# Patient Record
Sex: FEMALE | Race: BLACK OR AFRICAN AMERICAN | NOT HISPANIC OR LATINO | Employment: FULL TIME | URBAN - METROPOLITAN AREA
[De-identification: names, ages, dates, MRNs, and addresses within clinical notes are randomized per-mention and may not be internally consistent; named-entity substitution may affect disease eponyms.]

---
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
## 2018-03-09 ENCOUNTER — HOSPITAL ENCOUNTER (EMERGENCY)
Facility: HOSPITAL | Age: 18
Discharge: HOME | End: 2018-03-09
Attending: EMERGENCY MEDICINE
Payer: MEDICAID

## 2018-03-09 VITALS
OXYGEN SATURATION: 100 % | DIASTOLIC BLOOD PRESSURE: 85 MMHG | HEIGHT: 60 IN | HEART RATE: 55 BPM | WEIGHT: 120 LBS | TEMPERATURE: 98 F | SYSTOLIC BLOOD PRESSURE: 139 MMHG | RESPIRATION RATE: 18 BRPM | BODY MASS INDEX: 23.56 KG/M2

## 2018-03-09 DIAGNOSIS — R10.11 RIGHT UPPER QUADRANT ABDOMINAL PAIN: Primary | ICD-10-CM

## 2018-03-09 LAB
ALBUMIN SERPL BCP-MCNC: 4.5 G/DL
ALP SERPL-CCNC: 62 U/L
ALT SERPL W/O P-5'-P-CCNC: 13 U/L
ANION GAP SERPL CALC-SCNC: 7 MMOL/L
AST SERPL-CCNC: 17 U/L
B-HCG UR QL: NEGATIVE
BACTERIA #/AREA URNS HPF: NORMAL /HPF
BASOPHILS # BLD AUTO: 0.02 K/UL
BASOPHILS NFR BLD: 0.4 %
BILIRUB SERPL-MCNC: 0.8 MG/DL
BILIRUB UR QL STRIP: NEGATIVE
BUN SERPL-MCNC: 11 MG/DL
CALCIUM SERPL-MCNC: 9.5 MG/DL
CHLORIDE SERPL-SCNC: 109 MMOL/L
CLARITY UR: CLEAR
CO2 SERPL-SCNC: 24 MMOL/L
COLOR UR: YELLOW
CREAT SERPL-MCNC: 1.2 MG/DL
CTP QC/QA: YES
DIFFERENTIAL METHOD: ABNORMAL
EOSINOPHIL # BLD AUTO: 0.1 K/UL
EOSINOPHIL NFR BLD: 1.1 %
ERYTHROCYTE [DISTWIDTH] IN BLOOD BY AUTOMATED COUNT: 12.3 %
EST. GFR  (AFRICAN AMERICAN): >60 ML/MIN/1.73 M^2
EST. GFR  (NON AFRICAN AMERICAN): >60 ML/MIN/1.73 M^2
GLUCOSE SERPL-MCNC: 99 MG/DL
GLUCOSE UR QL STRIP: NEGATIVE
HCT VFR BLD AUTO: 40.4 %
HGB BLD-MCNC: 13.6 G/DL
HGB UR QL STRIP: ABNORMAL
KETONES UR QL STRIP: NEGATIVE
LEUKOCYTE ESTERASE UR QL STRIP: NEGATIVE
LIPASE SERPL-CCNC: 15 U/L
LYMPHOCYTES # BLD AUTO: 3 K/UL
LYMPHOCYTES NFR BLD: 53.6 %
MCH RBC QN AUTO: 28.2 PG
MCHC RBC AUTO-ENTMCNC: 33.7 G/DL
MCV RBC AUTO: 84 FL
MICROSCOPIC COMMENT: NORMAL
MONOCYTES # BLD AUTO: 0.3 K/UL
MONOCYTES NFR BLD: 5.1 %
NEUTROPHILS # BLD AUTO: 2.2 K/UL
NEUTROPHILS NFR BLD: 39.8 %
NITRITE UR QL STRIP: NEGATIVE
PH UR STRIP: 5 [PH] (ref 5–8)
PLATELET # BLD AUTO: 356 K/UL
PMV BLD AUTO: 10.3 FL
POTASSIUM SERPL-SCNC: 4.2 MMOL/L
PROT SERPL-MCNC: 7.4 G/DL
PROT UR QL STRIP: NEGATIVE
RBC # BLD AUTO: 4.82 M/UL
RBC #/AREA URNS HPF: 2 /HPF (ref 0–4)
SODIUM SERPL-SCNC: 140 MMOL/L
SP GR UR STRIP: 1.02 (ref 1–1.03)
SQUAMOUS #/AREA URNS HPF: 10 /HPF
URN SPEC COLLECT METH UR: ABNORMAL
UROBILINOGEN UR STRIP-ACNC: NEGATIVE EU/DL
WBC # BLD AUTO: 5.54 K/UL
WBC #/AREA URNS HPF: 4 /HPF (ref 0–5)

## 2018-03-09 PROCEDURE — 81000 URINALYSIS NONAUTO W/SCOPE: CPT

## 2018-03-09 PROCEDURE — 81025 URINE PREGNANCY TEST: CPT | Performed by: NURSE PRACTITIONER

## 2018-03-09 PROCEDURE — 63600175 PHARM REV CODE 636 W HCPCS: Performed by: NURSE PRACTITIONER

## 2018-03-09 PROCEDURE — 83690 ASSAY OF LIPASE: CPT

## 2018-03-09 PROCEDURE — 99284 EMERGENCY DEPT VISIT MOD MDM: CPT | Mod: 25

## 2018-03-09 PROCEDURE — 96374 THER/PROPH/DIAG INJ IV PUSH: CPT

## 2018-03-09 PROCEDURE — 96361 HYDRATE IV INFUSION ADD-ON: CPT

## 2018-03-09 PROCEDURE — 80053 COMPREHEN METABOLIC PANEL: CPT

## 2018-03-09 PROCEDURE — 25000003 PHARM REV CODE 250: Performed by: NURSE PRACTITIONER

## 2018-03-09 PROCEDURE — 85025 COMPLETE CBC W/AUTO DIFF WBC: CPT

## 2018-03-09 RX ORDER — ONDANSETRON 2 MG/ML
4 INJECTION INTRAMUSCULAR; INTRAVENOUS
Status: COMPLETED | OUTPATIENT
Start: 2018-03-09 | End: 2018-03-09

## 2018-03-09 RX ORDER — DICYCLOMINE HYDROCHLORIDE 20 MG/1
20 TABLET ORAL 2 TIMES DAILY PRN
Qty: 10 TABLET | Refills: 0 | Status: SHIPPED | OUTPATIENT
Start: 2018-03-09 | End: 2018-04-08

## 2018-03-09 RX ORDER — IBUPROFEN 600 MG/1
600 TABLET ORAL EVERY 6 HOURS PRN
Qty: 20 TABLET | Refills: 0 | Status: SHIPPED | OUTPATIENT
Start: 2018-03-09 | End: 2019-07-19

## 2018-03-09 RX ORDER — ONDANSETRON 4 MG/1
4 TABLET, FILM COATED ORAL EVERY 6 HOURS PRN
Qty: 10 TABLET | Refills: 0 | Status: SHIPPED | OUTPATIENT
Start: 2018-03-09 | End: 2019-07-19

## 2018-03-09 RX ADMIN — SODIUM CHLORIDE 1000 ML: 0.9 INJECTION, SOLUTION INTRAVENOUS at 04:03

## 2018-03-09 RX ADMIN — ONDANSETRON HYDROCHLORIDE 4 MG: 2 INJECTION, SOLUTION INTRAMUSCULAR; INTRAVENOUS at 04:03

## 2018-03-09 NOTE — ED PROVIDER NOTES
"Encounter Date: 3/9/2018    SCRIBE #1 NOTE: I, Nitesh Phillips, am scribing for, and in the presence of,  Esther Francois NP. I have scribed the following portions of the note - Other sections scribed: HPI and ROS.       History     Chief Complaint   Patient presents with    Abdominal Pain     "I went and gave some plasma yesterday and then I started feeling bad. At like 3:00 in the morning I started throwing up and my lips was tingling. I was feeling horrible, my stomach is flipping."      CC: Abdominal Pain     HPI: This 18 y.o F with no pertinent PMHx presents to the ED c/o acute onset of constant and severe (8/10) RUQ abdominal pain with associated nausea, chills, yellow emesis x4 and "tingling" lips since 0300. The pt states she is unable to tolerate p.o. The pt notes she donated plasma yesterday. The pt also states she has been bleeding constantly x1 month after discontinuing hormone birth control x5 months ago. 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 Additionally, the pt reports a sore throat x several months. The pt denies diarrhea, fever, cough, vaginal discharge, dysuria and frequency. No sick contacts. No prior tx.       The history is provided by the patient. No  was used.     Review of patient's allergies indicates:  No Known Allergies  Past Medical History:   Diagnosis Date    Asthma      Past Surgical History:   Procedure Laterality Date    WISDOM TOOTH EXTRACTION       No family history on file.  Social History   Substance Use Topics    Smoking status: Current Some Day Smoker    Smokeless tobacco: Never Used    Alcohol use Yes      Comment: 0cc     Review of Systems   Constitutional: Positive for chills. Negative for diaphoresis and fever.   HENT: Negative for rhinorrhea and sore throat.    Eyes: Negative for redness.   Respiratory: Negative for cough and shortness of breath.    Cardiovascular: Negative for chest pain.   Gastrointestinal: " "Positive for abdominal pain (RUQ), nausea and vomiting ("yellow" x4). Negative for diarrhea.   Genitourinary: Positive for vaginal bleeding. Negative for dysuria, frequency and urgency.   Musculoskeletal: Negative for back pain and neck pain.   Skin: Negative for rash.   Psychiatric/Behavioral: The patient is not nervous/anxious.        Physical Exam     Initial Vitals [03/09/18 1454]   BP Pulse Resp Temp SpO2   135/81 60 18 99 °F (37.2 °C) 98 %      MAP       99         Physical Exam    Constitutional: She appears well-developed and well-nourished. She is not diaphoretic. No distress.   HENT:   Head: Normocephalic and atraumatic.   Right Ear: External ear normal.   Left Ear: External ear normal.   Nose: Nose normal.   Mouth/Throat: Oropharynx is clear and moist. No oropharyngeal exudate.   Eyes: Conjunctivae and EOM are normal. Pupils are equal, round, and reactive to light. Right eye exhibits no discharge.   Neck: Normal range of motion.   Cardiovascular: Normal rate, regular rhythm and normal heart sounds. Exam reveals no gallop and no friction rub.    No murmur heard.  Pulmonary/Chest: Breath sounds normal. No respiratory distress. She has no wheezes. She has no rhonchi. She has no rales. She exhibits no tenderness.   Abdominal: Soft. Bowel sounds are normal. She exhibits no distension and no mass. There is no hepatosplenomegaly. There is tenderness in the right upper quadrant and epigastric area. There is no rigidity, no rebound, no guarding, no CVA tenderness, no tenderness at McBurney's point and negative Flanagan's sign.   Musculoskeletal: Normal range of motion.   Neurological: She is alert and oriented to person, place, and time.   Skin: Skin is warm and dry.   Psychiatric: She has a normal mood and affect. Her behavior is normal.         ED Course   Procedures  Labs Reviewed   CBC W/ AUTO DIFFERENTIAL - Abnormal; Notable for the following:        Result Value    Platelets 356 (*)     Lymph% 53.6 (*)     All " other components within normal limits   COMPREHENSIVE METABOLIC PANEL - Abnormal; Notable for the following:     Anion Gap 7 (*)     All other components within normal limits   URINALYSIS - Abnormal; Notable for the following:     Occult Blood UA 2+ (*)     All other components within normal limits   LIPASE   URINALYSIS MICROSCOPIC   POCT URINE PREGNANCY             Medical Decision Making:   ED Management:  This is an evaluation of a 18 y.o. female that presents to the Emergency Department for Abdominal Pain. Physical Exam shows a non-toxic, afebrile, and well appearing female. On examination the abdomen is flat without distention.  There is no surface trauma, scars, incisions.  Bowel sounds are present in all 4 quadrants.  She exhibits some tenderness with palpation of the right upper quadrant as well as epigastric region.  No guarding, rigidity to palpation.  No masses, pulsation in epigastric area.  No organomegaly.  Negative Flanagan sign.  No periumbilical tenderness.  No rebound in the lower quadrants.  Nontender over McBurney's point.  No suprapubic tenderness or distention.  Good femoral pulses bilaterally.  No CVA tenderness.    Vital Signs Are Reassuring.  RESULTS:   UPT negative.  CBC was negativefor leukocytosis  indicating unlikely systemic infection, the H&H was stable and was within normal limits and indicating absence of anemia. The platelet count was normal indicating the absence of a tendency toward bleeding.  The chemistry was negative for hypo-or hyper natremia, kalemia, chloridemia, or other electrolyte abnormalities; BUN and creatinine were within normal limits indicating normal kidney function, ALT and AST were within normal limits indicating normal liver function, there was no transaminitis.  Lipase within normal limits indicating normal pancreatic function.  UA is negative for infection, no nitrites, leukocytes, or protein present.2+ occult blood; patient is menstruating.  Ultrasound of the  "abdomen with possible trace perihepatic fluid.  No findings suggestive of acute cholecystitis.    My overall impression is right upper quadrant abdominal pain. I considered, but at this time, do not suspect appendicitis, pancreatitis, mesenteric ischemia, obstruction, cholecystitis, peptic ulcer disease, pancreatitis, diverticulitis, colitis, volvulus, hernia, UTI.    She was given IV fluids and Zofran with good relief in symptoms.  No episodes of vomiting while in the ED.  She is tolerating oral intake reporting she is "hungry".  Abdomen remains soft.  I'll discharge her home with short course of Zofran and Bentyl for nausea and abdominal cramping.  Abdominal pain precautions given.    ED Course: IV fluids, Zofran. D/C Meds: Zofran, Bentyl, ibuprofen. Additional D/C Information: Abdominal Pain Precautions. The diagnosis, treatment plan, instructions for follow-up and reevaluation with her PCP as well as ED return precautions were discussed and understanding was verbalized. All questions or concerns have been addressed.     This case was discussed with Dr. Marvin who is in agreement with my assessment and plan.             Scribe Attestation:   Scribe #1: I performed the above scribed service and the documentation accurately describes the services I performed. I attest to the accuracy of the note.    Attending Attestation:           Physician Attestation for Scribe:  Physician Attestation Statement for Scribe #1: I, Esther Francois NP, reviewed documentation, as scribed by Nitesh Phillips in my presence, and it is both accurate and complete.                    Clinical Impression:   The encounter diagnosis was Right upper quadrant abdominal pain.    Disposition:   Disposition: Discharged  Condition: Stable                        Esther Francois NP  03/09/18 9524    "

## 2018-03-09 NOTE — ED TRIAGE NOTES
Pt states she.s had stomach pain, nausea, vomitting starting last night. States she took ibuprofen for pain. Denies any trauma

## 2018-03-10 NOTE — DISCHARGE INSTRUCTIONS
You have been prescribed ibuprofen for pain. This is an Non-Steroidal Anti-Inflammatory (NSAID) Medication. Please do not take any additional NSAIDs while you are taking this medication including (Advil, Aleve, Motrin, Ibuprofen, Mobic\meloxicam, Naprosyn, etc.). Please stop taking this medication if you experience: weakness, itching, yellow skin or eyes, joint pains, vomiting blood, blood or black stools, unusual weight gain, or swelling in your arms, legs, hands, or feet.     Please return to the Emergency Department for any new or worsening symptoms including: worsening abdominal pain, dark\black\bloody bowel movements, vomiting blood, hard abdomen, fever, chest pain, shortness of breath, loss of consciousness or any other concerns.     Please follow up with your Primary Care Provider within in the week. If you do not have a Primary Care Provider, you may contact the one listed on your discharge paperwork or you may also call the [facility] Appointment Desk at [phone] to schedule an appointment with a Primary Care Provider.

## 2019-07-19 ENCOUNTER — OFFICE VISIT (OUTPATIENT)
Dept: OBSTETRICS AND GYNECOLOGY | Facility: CLINIC | Age: 19
End: 2019-07-19
Payer: MEDICAID

## 2019-07-19 VITALS
HEIGHT: 60 IN | DIASTOLIC BLOOD PRESSURE: 61 MMHG | SYSTOLIC BLOOD PRESSURE: 110 MMHG | BODY MASS INDEX: 24.17 KG/M2 | WEIGHT: 123.13 LBS

## 2019-07-19 DIAGNOSIS — Z32.01 PREGNANCY CONFIRMED BY POSITIVE URINE TEST: Primary | ICD-10-CM

## 2019-07-19 DIAGNOSIS — Z34.00 SUPERVISION OF NORMAL FIRST PREGNANCY, ANTEPARTUM: ICD-10-CM

## 2019-07-19 LAB
B-HCG UR QL: POSITIVE
CTP QC/QA: YES

## 2019-07-19 PROCEDURE — 99203 PR OFFICE/OUTPT VISIT, NEW, LEVL III, 30-44 MIN: ICD-10-PCS | Mod: S$PBB,TH,, | Performed by: OBSTETRICS & GYNECOLOGY

## 2019-07-19 PROCEDURE — 81025 URINE PREGNANCY TEST: CPT | Mod: PBBFAC | Performed by: OBSTETRICS & GYNECOLOGY

## 2019-07-19 PROCEDURE — 99999 PR PBB SHADOW E&M-EST. PATIENT-LVL II: CPT | Mod: PBBFAC,,, | Performed by: OBSTETRICS & GYNECOLOGY

## 2019-07-19 PROCEDURE — 99999 PR PBB SHADOW E&M-EST. PATIENT-LVL II: ICD-10-PCS | Mod: PBBFAC,,, | Performed by: OBSTETRICS & GYNECOLOGY

## 2019-07-19 PROCEDURE — 99212 OFFICE O/P EST SF 10 MIN: CPT | Mod: PBBFAC,TH | Performed by: OBSTETRICS & GYNECOLOGY

## 2019-07-19 PROCEDURE — 99203 OFFICE O/P NEW LOW 30 MIN: CPT | Mod: S$PBB,TH,, | Performed by: OBSTETRICS & GYNECOLOGY

## 2019-07-19 NOTE — PROGRESS NOTES
History & Physical  Gynecology      SUBJECTIVE:     Chief Complaint: Possible Pregnancy       History of Present Illness:  Missed Menses/ Possible Pregnancy  Ms. Islas is a G0 who presents to clinic who complains of menstrual irregularity. She believes she could be pregnant. Pregnancy is desired. Sexual Activity: single partner, contraception: none. Current symptoms also include: breast tenderness, fatigue, morning sickness, nausea and positive home pregnancy test. Last period was normal.     Patient's last menstrual period was 2019. OLIVA: 03/10/2020  EGA: 6w3d    Review of patient's allergies indicates:  No Known Allergies    History reviewed. No pertinent past medical history.  Past Surgical History:   Procedure Laterality Date    WISDOM TOOTH EXTRACTION       OB History        0    Para   0    Term   0       0    AB   0    Living   0       SAB   0    TAB   0    Ectopic   0    Multiple   0    Live Births   0               Family History   Problem Relation Age of Onset    Breast cancer Maternal Grandmother     Cancer Brother         stomach     Social History     Tobacco Use    Smoking status: Former Smoker     Last attempt to quit: 2019     Years since quittin.0    Smokeless tobacco: Never Used   Substance Use Topics    Alcohol use: Yes     Comment: 0cc    Drug use: No       Current Outpatient Medications   Medication Sig    prenatal 26-iron ps-folic-dha (VITAFOL-ONE) 29 mg iron- 1 mg-200 mg Cap Take 1 capsule by mouth once daily.     No current facility-administered medications for this visit.      Review of Systems:  Review of Systems   Constitutional: Negative for chills and fever.   Respiratory: Negative for cough and wheezing.    Cardiovascular: Negative for chest pain and palpitations.   Gastrointestinal: Positive for nausea. Negative for abdominal pain and vomiting.   Genitourinary: Negative for dysuria, frequency, hematuria, pelvic pain, vaginal bleeding, vaginal  discharge and vaginal pain.   Integumentary:  Positive for breast tenderness.   Neurological: Negative for headaches.   Breast: Positive for tenderness.       OBJECTIVE:     Physical Exam:  Physical Exam   Constitutional: She is oriented to person, place, and time. She appears well-developed and well-nourished.   Cardiovascular: Normal rate.   Pulmonary/Chest: Effort normal. No respiratory distress.   Genitourinary: Pelvic exam was performed with patient supine.   Neurological: She is alert and oriented to person, place, and time.   Skin: Skin is warm and dry.   Psychiatric: She has a normal mood and affect.   Nursing note and vitals reviewed.      ASSESSMENT:       ICD-10-CM ICD-9-CM    1. Pregnancy confirmed by positive urine test Z32.01 V72.42 POCT urine pregnancy      prenatal 26-iron ps-folic-dha (VITAFOL-ONE) 29 mg iron- 1 mg-200 mg Cap   2. Supervision of normal first pregnancy, antepartum Z34.00 V22.0 US OB/GYN Procedure ([facility]) - Extended List - Future      Plan:      Earnest was seen today for possible pregnancy.    Diagnoses and all orders for this visit:    Pregnancy confirmed by positive urine test  -     POCT urine pregnancy; positive  -     prenatal 26-iron ps-folic-dha (VITAFOL-ONE) 29 mg iron- 1 mg-200 mg Cap; Take 1 capsule by mouth once daily.  - Reviewed Past medical, surgical, and obstetric history  - Reviewed medication lists  - First trimester dating/confirmation US ordered and scheduled today  - Patient was counseled today on routine 1st trimester precautions, including vaginal bleeding and abdominal pain. We also discussed proper weight gain based on the [place] [facility]'s recommendations based on her pre-pregnancy weight, foods to avoid in pregnancy (i.e. sushi, fish that are high in mercury, cold deli meat, and unpasteurized cheeses), environmental precautions such as cat litter, and prenatal vitamin options (i.e. stool softener, DHA).     Orders Placed This Encounter    Procedures    POCT urine pregnancy    US OB/GYN Procedure ([facility]) - Extended List - Future       Follow up in about 4 weeks (around 8/16/2019) for initial OB.    Counseling time: 15 minutes    Aletha Pang

## 2019-07-19 NOTE — PATIENT INSTRUCTIONS
Pregnancy    Your exam today shows that you are pregnant.  Pregnancy symptoms  During pregnancy your bodys hormones change. This causes physical and emotional changes. This is normal. Knowing what to expect is important for your piece of mind and so you know when to seek help for a problem. Here are some of the most common symptoms:  · Morning sickness or nausea. This can happen any time of the day or night.  · Tender, swollen breasts  · Need to urinate frequently  · Tiredness or fatigue  · Dizziness  · Indigestion or heartburn  · Food cravings or turn-offs  · Constipation  · Emotional changes. This can range from anxiety to excitement to depression.  General care for a healthy pregnancy  Here are things you can do to help make sure your baby is born healthy:  · Rest when you feel tired. This is especially true in the later months of pregnancy.  · Drink more fluids. Your body needs more fluids than you may be used to. Drink 8 to10 glasses of juice, milk, or water every day.  · Eat well-balanced meals. Eat at regular times to give your body enough protein. You can expect to gain about 30 pounds during the pregnancy. Dont try to diet or lose weight while you are pregnant.  · Take a prenatal vitamin every day. This helps you meet the extra nutritional needs of pregnancy.  · Dont take any other medicine during your pregnancy unless your healthcare provider tells you to. This includes prescription medicines and those you buy over the counter. Many medicines can harm the growing baby.  · If you have nausea or vomiting, dont eat greasy or fried foods. Eat several smaller meals throughout the day rather than 3 large meals.  · If you smoke, you must stop. The nicotine you breathe in goes right to the baby.  · Stay away from alcohol, even in moderate amounts. Daily drinking will harm your baby and can cause permanent brain damage.  · Dont use recreational drugs, especially cocaine, crack, and heroin. These will harm  your baby. Also avoid marijuana.  · If you were using recreational drugs or prescribed medicine when you found out that you were pregnant, talk with your healthcare provider about possible effects on your growing baby.  · If you have medical problems that you need to take medicine for, talk with your healthcare provider.  Follow-up care  Call your healthcare provider to arrange for prenatal care. Prenatal care is important. You can see your family provider, a pregnancy specialist (obstetrician), or a primary care clinic.  When to seek medical advice  Call your healthcare provider right away if any of these occur:  · Vaginal bleeding  · Pain in your belly (abdomen) or back that is moderate or severe  · Lots of vomiting, or you cant keep any fluids down for 6 hours  · Burning feeling when you urinate  · Headache, dizziness, or rapid weight gain  · Fever  · Vision changes or blurred vision  Date Last Reviewed: 10/1/2016  © 0372-7478 [employer]. [address] [place] [address], [place] [address]. All rights reserved. This information is not intended as a substitute for professional medical care. Always follow your healthcare professional's instructions.

## 2019-07-30 ENCOUNTER — TELEPHONE (OUTPATIENT)
Dept: OTOLARYNGOLOGY | Facility: CLINIC | Age: 19
End: 2019-07-30

## 2019-07-30 DIAGNOSIS — O21.9 NAUSEA AND VOMITING IN PREGNANCY: Primary | ICD-10-CM

## 2019-07-30 RX ORDER — ONDANSETRON 4 MG/1
8 TABLET, ORALLY DISINTEGRATING ORAL EVERY 8 HOURS
Qty: 20 TABLET | Refills: 0 | Status: ON HOLD | OUTPATIENT
Start: 2019-07-30 | End: 2020-03-03 | Stop reason: HOSPADM

## 2019-08-09 ENCOUNTER — PROCEDURE VISIT (OUTPATIENT)
Dept: MATERNAL FETAL MEDICINE | Facility: CLINIC | Age: 19
End: 2019-08-09
Payer: MEDICAID

## 2019-08-09 DIAGNOSIS — Z34.00 SUPERVISION OF NORMAL FIRST PREGNANCY, ANTEPARTUM: ICD-10-CM

## 2019-08-09 PROCEDURE — 99499 UNLISTED E&M SERVICE: CPT | Mod: S$PBB,,, | Performed by: PEDIATRICS

## 2019-08-09 PROCEDURE — 76801 PR US, OB <14WKS, TRANSABD, SINGLE GESTATION: ICD-10-PCS | Mod: 26,S$PBB,, | Performed by: PEDIATRICS

## 2019-08-09 PROCEDURE — 76817 PR US, OB, TRANSVAG APPROACH: ICD-10-PCS | Mod: 26,S$PBB,, | Performed by: PEDIATRICS

## 2019-08-09 PROCEDURE — 76817 TRANSVAGINAL US OBSTETRIC: CPT | Mod: PBBFAC,PO | Performed by: PEDIATRICS

## 2019-08-09 PROCEDURE — 76801 OB US < 14 WKS SINGLE FETUS: CPT | Mod: PBBFAC,PO | Performed by: PEDIATRICS

## 2019-08-09 PROCEDURE — 76801 OB US < 14 WKS SINGLE FETUS: CPT | Mod: 26,S$PBB,, | Performed by: PEDIATRICS

## 2019-08-09 PROCEDURE — 99499 NO LOS: ICD-10-PCS | Mod: S$PBB,,, | Performed by: PEDIATRICS

## 2019-08-09 PROCEDURE — 76817 TRANSVAGINAL US OBSTETRIC: CPT | Mod: 26,S$PBB,, | Performed by: PEDIATRICS

## 2019-08-16 ENCOUNTER — INITIAL PRENATAL (OUTPATIENT)
Dept: OBSTETRICS AND GYNECOLOGY | Facility: CLINIC | Age: 19
End: 2019-08-16
Payer: MEDICAID

## 2019-08-16 VITALS — SYSTOLIC BLOOD PRESSURE: 100 MMHG | BODY MASS INDEX: 24.03 KG/M2 | WEIGHT: 123 LBS | DIASTOLIC BLOOD PRESSURE: 60 MMHG

## 2019-08-16 DIAGNOSIS — N88.9 CERVICAL LESION: ICD-10-CM

## 2019-08-16 DIAGNOSIS — O21.9 NAUSEA AND VOMITING IN PREGNANCY: ICD-10-CM

## 2019-08-16 DIAGNOSIS — Z34.00 SUPERVISION OF NORMAL FIRST PREGNANCY, ANTEPARTUM: Primary | ICD-10-CM

## 2019-08-16 PROCEDURE — 99215 PR OFFICE/OUTPT VISIT, EST, LEVL V, 40-54 MIN: ICD-10-PCS | Mod: TH,S$PBB,, | Performed by: OBSTETRICS & GYNECOLOGY

## 2019-08-16 PROCEDURE — 99215 OFFICE O/P EST HI 40 MIN: CPT | Mod: TH,S$PBB,, | Performed by: OBSTETRICS & GYNECOLOGY

## 2019-08-16 PROCEDURE — 87491 CHLMYD TRACH DNA AMP PROBE: CPT

## 2019-08-16 PROCEDURE — 87481 CANDIDA DNA AMP PROBE: CPT | Mod: 59

## 2019-08-16 PROCEDURE — 88175 CYTOPATH C/V AUTO FLUID REDO: CPT

## 2019-08-16 PROCEDURE — 87086 URINE CULTURE/COLONY COUNT: CPT

## 2019-08-16 PROCEDURE — 99212 OFFICE O/P EST SF 10 MIN: CPT | Mod: PBBFAC,TH | Performed by: OBSTETRICS & GYNECOLOGY

## 2019-08-16 PROCEDURE — 87801 DETECT AGNT MULT DNA AMPLI: CPT

## 2019-08-16 PROCEDURE — 99999 PR PBB SHADOW E&M-EST. PATIENT-LVL II: ICD-10-PCS | Mod: PBBFAC,,, | Performed by: OBSTETRICS & GYNECOLOGY

## 2019-08-16 PROCEDURE — 87661 TRICHOMONAS VAGINALIS AMPLIF: CPT

## 2019-08-16 PROCEDURE — 87529 HSV DNA AMP PROBE: CPT | Mod: 59

## 2019-08-16 PROCEDURE — 99999 PR PBB SHADOW E&M-EST. PATIENT-LVL II: CPT | Mod: PBBFAC,,, | Performed by: OBSTETRICS & GYNECOLOGY

## 2019-08-16 RX ORDER — METOCLOPRAMIDE 5 MG/1
5 TABLET ORAL 4 TIMES DAILY PRN
Qty: 40 TABLET | Refills: 0 | Status: SHIPPED | OUTPATIENT
Start: 2019-08-16 | End: 2019-09-10 | Stop reason: SDUPTHER

## 2019-08-16 NOTE — PATIENT INSTRUCTIONS
Adapting to Pregnancy: First Trimester  As your body adjusts, you may have to change or limit your daily activities. Youll need more rest. You may also need to use the energy you have more wisely.     Eat stomach-friendly foods like cottage cheese, crackers, or bread throughout the day.   Your changing body  Almost every part of your body is affected as you adapt to pregnancy. The uterus and cervix will begin to soften right away. You may not look very pregnant during the first 3 months. But you are likely to have some common signs of early pregnancy:  · Nausea  · Fatigue  · Frequent urination  · Mood swings  · Bloating of the abdomen  · Missed or light periods (first trimester bleeding)  · Nipple or breast tenderness, breast swelling  Its not too late to start good habits  What matters most is protecting your baby from this moment on. If you smoke, drink alcohol, or use drugs, now is the time to stop. If you need help, talk with your healthcare provider.  · Smoking increases the risk of  stillbirth or having a low-birth-weight baby. If you smoke, quit now.  · Alcohol and drugs have been linked with miscarriage, birth defects, intellectual disability, and low birth weight. Do not drink alcohol or take drugs.  Tips to relieve nausea  Although nausea can happen at any time of the day, it may be worse in the morning. To help prevent nausea:  · Eat small, light meals at frequent intervals.  · Get up slowly. Eat a few unsalted crackers before you get out of bed.  · Avoid smells that bother you.  · Avoid spicy and fatty foods.  · Eat an ice pop in your favorite flavor.  · Get plenty of rest.  · Ask your healthcare provider about taking christopher or vitamin B6 for nausea and vomiting.  · Talk with your healthcare provider if you take vitamins that upset your stomach.  Work concerns  The end of the first trimester is a good time to discuss working during pregnancy with your employer. Follow your healthcare providers  advice if your job requires you to stand for a long time, work with hazardous tools, or even sit at a desk all day. Your workspace, workload, or scheduled hours may need to be adjusted. Perhaps you can change body postures more often or take an extra break.  Advice for travel  Talk to your healthcare provider first, but the second trimester may be the best time for any travel. You may be advised to avoid certain trips while youre pregnant. Food and water can be concerns in developing countries. Travel by car is a good choice, as you can stop, get out, and stretch. Bring snacks and water along. Fasten the lap belt below your belly, low over your hips. Also be sure to wear the shoulder harness.  Intimacy  Unless your healthcare provider tells you to, there is no reason to stop having sex while youre pregnant. You or your partner may notice changes in desire. Desire may be less in the first trimester, due to nausea and fatigue. In the second trimester, sex may be very enjoyable. The third trimester can be a challenge comfort-wise. Try different positions and see whats best for you both.  Date Last Reviewed: 8/16/2015  © 3337-2309 [employer]. [address] [place] [address], [place] [address]. All rights reserved. This information is not intended as a substitute for professional medical care. Always follow your healthcare professional's instructions.

## 2019-08-16 NOTE — PROGRESS NOTES
Earnest Islas is a 19 y.o. , presents today for initial OB visit    HPI: She reports amenorrhea since Patient's last menstrual period was 2019.. Prior to LMP, menses were regular occuring every 28 days prior to this.  She is not currently on any contraception. She c/o daily reports nausea and  vomiting. She reports that she has noticed breast tenderness. She denies vaginal bleeding since LMP.    SOCIAL HISTORY: Denies emotional/mental/physical/sexual violence or abuse. Feels safe at home.Feeling excited about pregnancy. She does not work. This is the first baby for both of them.     PAP HISTORY: She has never had a pap smear in the past. She denies any history of abnormal pap smear or STDs.     She denies long-term chronic medical conditions.    Review of patient's allergies indicates:  No Known Allergies  History reviewed. No pertinent past medical history.  Past Surgical History:   Procedure Laterality Date    WISDOM TOOTH EXTRACTION  2018     Past Surgical History:   Procedure Laterality Date    WISDOM TOOTH EXTRACTION  2018     OB History    Para Term  AB Living   1 0 0 0 0 0   SAB TAB Ectopic Multiple Live Births   0 0 0 0 0      # Outcome Date GA Lbr Chad/2nd Weight Sex Delivery Anes PTL Lv   1 Current              OB History        1    Para   0    Term   0       0    AB   0    Living   0       SAB   0    TAB   0    Ectopic   0    Multiple   0    Live Births   0               Social History     Socioeconomic History    Marital status: Single     Spouse name: Not on file    Number of children: Not on file    Years of education: Not on file    Highest education level: Not on file   Occupational History    Not on file   Social Needs    Financial resource strain: Not on file    Food insecurity:     Worry: Not on file     Inability: Not on file    Transportation needs:     Medical: Not on file     Non-medical: Not on file   Tobacco Use    Smoking status:  Former Smoker     Last attempt to quit: 2019     Years since quittin.1    Smokeless tobacco: Never Used   Substance and Sexual Activity    Alcohol use: Yes     Comment: 0cc    Drug use: No    Sexual activity: Yes     Birth control/protection: None   Lifestyle    Physical activity:     Days per week: Not on file     Minutes per session: Not on file    Stress: Not on file   Relationships    Social connections:     Talks on phone: Not on file     Gets together: Not on file     Attends [employer] service: Not on file     Active member of club or organization: Not on file     Attends meetings of clubs or organizations: Not on file     Relationship status: Not on file   Other Topics Concern    Not on file   Social History Narrative    Not on file     Family History   Problem Relation Age of Onset    Breast cancer Maternal Grandmother     Cancer Brother         stomach     Social History     Substance and Sexual Activity   Sexual Activity Yes    Birth control/protection: None       GENETIC SCREENING   Patient's age 35 years or older as of estimated date of delivery? NO  Neural tube defect (meningomyelocele, spina bifida, or anencephaly)? NO  Down syndrome? NO  Seth-Sachs (Ashkenazi Anabaptist, Cajun, Georgian Surinamese)? NO  Canavan disease (Ashkenazi Anabaptist)? NO  Familial dysautonomia (Ashkenazi Anabaptist)? NO  Sickle cell disease or trait ()? NO  Hemophilia or other blood disorders? NO  Cystic fibrosis? NO  Muscular dystrophy? NO  Talisha's chorea? NO  Thalassemia (Italian, Greek, Mediterranean, or  background) MCV less than 80? NO  Congenital heart defect? NO  Mental retardation/autism? NO   If Yes, was person tested for Fragile X? NO  Other inherited genetic or chromosomal disorder? NO  Maternal metabolic disorder (e.g. type 1 diabetes, PKU)? NO  Patient or baby's father had a child with birth defects not listed above? NO  Recurrent pregnancy loss or a stillbirth: NO  Medications (including  supplements, vitamins, herbs or OTC drugs)/illicit/recreational drugs/alcohol since last menstrual period? none    Dean Jensen MD     ROS:  Constitutional/Gen: Denies fevers, chills, malaise, or weight loss. reports fatigue   Psych: Denies depression, anxiety  CV/vasc: Denies heart palpitations or edema  Resp: Denies SOB or dyspnea  Breasts: Denies mass, nipple discharge, or trauma. reports breast tenderness.  GI: Denies constipation, diarrhea, or vomiting. reports nausea.  : Denies vaginal discharge, dysuria or pelvic pain. reports urinary frequency      OBJECTIVE:  /60   Wt 55.8 kg (123 lb 0.3 oz)   LMP 2019   BMI 24.03 kg/m²   Constitutional/Gen: NAD, appears stated age  Lung: normal resp effort, CTAB  Heart: normal HR, RRR   Back: negative CVAT  Abdomen: soft, nontender, no masses, and bowel sounds normal, no enlargement  External genitalia: no lesions or discharge, normal hair distribution  Vagina: normal appearance, no lesions, no discharge, no evidence cystocele or rectocele.  Cervix: normal appearance, no discharge, no lesions, negative CMT  Uterus: nontender, mobile, approx 12 week size, contour, and position  Neurologic: A&O x 3  Psych: affect appropriate and without signs of mood, thought or memory difficulty appreciated      ASSESSMENT:  19 y.o. female  with amenorrhea  Likely at 11w4d via US  Body mass index is 24.03 kg/m².  Patient Active Problem List   Diagnosis    Supervision of normal first pregnancy, antepartum       PLAN:  1. Supervision of Normal Pregnancy  -- + UPT in office, Patient's last menstrual period was 2019. --> OLIVA: 3/2/2020  -- Dating US done  -- Anatomical US ordered  -- Routine serum and urine prenatal labs today    2. Physical exam today  -- Discussed ASCCP pap guidelines. Last pap: none  -- Pap smear without HPV (19 y.o.) sent today    3. BMI 24.03  -- Discussed IOM recommended weight gain of:   Underweight Less than 18.5 28-40    Normal  Weight 18.5-24.9  25-35    Overweight 25-29.9  15-25    Obese   30 and greater  11-20   -- Discussed criteria for delivery at [facility] r/t excessive pre-preg weight or excessive weight gain:   Pre-pregnancy BMI over 40 or excess pregnancy weight gain defined as:   Pre-preg BMI < 18.5; Excess weight gain = > 60 pound   Pre-preg BMI 18.5-24.9;  Excess weight gain = > 53 pounds   Pre-preg BMI 25-29.9;  Excess weight gain = > 38 pounds   Pre-preg BMI > 30;  Excess weight gain = > 30 pounds    4. Discussed nausea and vomiting in pregnancy  -- Education regarding lifestyle and dietary modifications  -- Reviewed use of B6/Unisom prn. Pt will notify us if no relief/worsening symptoms, will consider alternative therapies prn    5. Pregnancy education and couseling; handouts and booklet provided  -- Oriented to practice and anticipated prenatal course of care and how to contact us  -- Precautions/warning signs reviewed  -- Common complaints of pregnancy  -- Routine prenatal labs including HIV  -- Ultrasounds  -- Nutrition, prepregnant BMI, and recommended weight gain  -- Toxoplasmosis precautions (Cats/Raw Meat)  -- Sexual activity and exercise  -- Environmental/Work hazards  -- Travel  -- Tobacco (Ask, Advise, Assess, Assist, and Arrange), as well as alcohol and drug use  -- Use of any medications (Including supplements, Vitamins, Herbs, or OTC Drugs)  -- Domestic violence screen negative    6. Reviewed genetic testing options. Reviewed available first trimester and/or second  trimester screening options. Reviewed risk of false positive/negative results and recommendation of referral to [facility] in event of a positive result, for NIPT, US, and/or amniocentesis. Reviewed the possible estimated 1 in 300/500 risk of miscarriage with amniocentesis, even with a healthy fetus. Patient desires genetic screening.      Earnest was seen today for initial prenatal visit.    Diagnoses and all orders for this visit:    Supervision of normal  first pregnancy, antepartum  -     HIV 1/2 Ag/Ab (4th Gen); Future  -     RPR; Future  -     Hemoglobin Electrophoresis,Hgb A2 Jarocho.; Future  -     Type & Screen - Ob Profile; Future  -     Rubella antibody, IgG; Future  -     Urine culture  -     C. trachomatis/N. gonorrhoeae by AMP DNA  -     Vaginosis Screen by DNA Probe  -     US MFM Procedure ([facility]); Future  -     CBC auto differential; Future  -     Comprehensive metabolic panel; Future  -     Hepatitis panel, acute; Future  -     Liquid-based pap smear, screening  -     Maternal Integrated Screen Part 1; Future    Nausea and vomiting in pregnancy  -     metoclopramide HCl (REGLAN) 5 MG tablet; Take 1 tablet (5 mg total) by mouth 4 (four) times daily as needed (nausea).    Cervical lesion  -     HSV by Rapid PCR, Non-Blood [facility]; Cervix (7 o'clock)        Orders Placed This Encounter   Procedures    Urine culture    C. trachomatis/N. gonorrhoeae by AMP DNA    Vaginosis Screen by DNA Probe    HIV 1/2 Ag/Ab (4th Gen)    RPR    Hemoglobin Electrophoresis,Hgb A2 Jarocho.    Rubella antibody, IgG    CBC auto differential    Comprehensive metabolic panel    Hepatitis panel, acute    Maternal Integrated Screen Part 1    HSV by Rapid PCR, Non-Blood [facility]; Cervix (7 o'clock)    Type & Screen - Ob Profile    US MFM Procedure ([facility])       Follow up in about 4 weeks (around 9/13/2019) for routine OB.      RTC x 4 weeks, call or present sooner prn.     Updated Medication List:  Current Outpatient Medications   Medication Sig Dispense Refill    ondansetron (ZOFRAN-ODT) 4 MG TbDL Take 2 tablets (8 mg total) by mouth every 8 (eight) hours. 20 tablet 0    prenatal 26-iron ps-folic-dha (VITAFOL-ONE) 29 mg iron- 1 mg-200 mg Cap Take 1 capsule by mouth once daily. 90 capsule 3    metoclopramide HCl (REGLAN) 5 MG tablet Take 1 tablet (5 mg total) by mouth 4 (four) times daily as needed (nausea). 40 tablet 0     No current facility-administered  medications for this visit.          Aletha Monahan MD  8/16/2019 11:25 AM

## 2019-08-18 LAB — BACTERIA UR CULT: NORMAL

## 2019-08-19 LAB
BACTERIAL VAGINOSIS DNA: NEGATIVE
C TRACH DNA SPEC QL NAA+PROBE: NOT DETECTED
CANDIDA GLABRATA DNA: NEGATIVE
CANDIDA KRUSEI DNA: NEGATIVE
CANDIDA RRNA VAG QL PROBE: POSITIVE
HSV1 DNA SPEC QL NAA+PROBE: NEGATIVE
HSV2 DNA SPEC QL NAA+PROBE: NEGATIVE
N GONORRHOEA DNA SPEC QL NAA+PROBE: NOT DETECTED
SPECIMEN SOURCE: NORMAL
T VAGINALIS RRNA GENITAL QL PROBE: NEGATIVE

## 2019-08-20 DIAGNOSIS — B37.31 YEAST VAGINITIS: Primary | ICD-10-CM

## 2019-08-20 RX ORDER — TERCONAZOLE 4 MG/G
1 CREAM VAGINAL DAILY
Qty: 1 TUBE | Refills: 0 | Status: SHIPPED | OUTPATIENT
Start: 2019-08-20 | End: 2019-09-13

## 2019-09-10 DIAGNOSIS — O21.9 NAUSEA AND VOMITING IN PREGNANCY: ICD-10-CM

## 2019-09-10 RX ORDER — METOCLOPRAMIDE 5 MG/1
5 TABLET ORAL 4 TIMES DAILY PRN
Qty: 40 TABLET | Refills: 0 | Status: ON HOLD | OUTPATIENT
Start: 2019-09-10 | End: 2020-03-03 | Stop reason: HOSPADM

## 2019-09-10 NOTE — TELEPHONE ENCOUNTER
----- Message from Fam Nicholson sent at 9/10/2019  1:54 PM CDT -----  Contact: Self: [phone]  Type: RX Refill Request    Who Called: Self    Refill or New Rx:Refill    RX Name and Strength:Nausea medication    How is the patient currently taking it? (2XDay)    Is this a 30 day or 90 day RX:90 Day    Preferred Pharmacy with phone number:..  [id number] - [place] [address] [address] [facility]  [facility] [address]  [facility] [address]  Phone: [phone] Fax: [phone]      Local or Mail Order:Local    Ordering Provider:Dr. Monahan    Would the patient rather a call back or a response via [facility]? Callback    Best Call Back Number:[phone]    Additional Information: N/A

## 2019-09-13 ENCOUNTER — LAB VISIT (OUTPATIENT)
Dept: LAB | Facility: HOSPITAL | Age: 19
End: 2019-09-13
Attending: OBSTETRICS & GYNECOLOGY
Payer: MEDICAID

## 2019-09-13 ENCOUNTER — CLINICAL SUPPORT (OUTPATIENT)
Dept: OBSTETRICS AND GYNECOLOGY | Facility: CLINIC | Age: 19
End: 2019-09-13
Payer: MEDICAID

## 2019-09-13 ENCOUNTER — ROUTINE PRENATAL (OUTPATIENT)
Dept: OBSTETRICS AND GYNECOLOGY | Facility: CLINIC | Age: 19
End: 2019-09-13
Payer: MEDICAID

## 2019-09-13 VITALS — SYSTOLIC BLOOD PRESSURE: 106 MMHG | BODY MASS INDEX: 24.61 KG/M2 | DIASTOLIC BLOOD PRESSURE: 63 MMHG | WEIGHT: 126 LBS

## 2019-09-13 VITALS — BODY MASS INDEX: 24.63 KG/M2 | WEIGHT: 126.13 LBS

## 2019-09-13 DIAGNOSIS — Z34.00 SUPERVISION OF NORMAL FIRST PREGNANCY, ANTEPARTUM: Primary | ICD-10-CM

## 2019-09-13 DIAGNOSIS — R04.0 EPISTAXIS: ICD-10-CM

## 2019-09-13 DIAGNOSIS — Z34.00 SUPERVISION OF NORMAL FIRST PREGNANCY, ANTEPARTUM: ICD-10-CM

## 2019-09-13 DIAGNOSIS — Z23 NEEDS FLU SHOT: Primary | ICD-10-CM

## 2019-09-13 LAB
ABO + RH BLD: NORMAL
ALBUMIN SERPL BCP-MCNC: 3.6 G/DL (ref 3.5–5.2)
ALP SERPL-CCNC: 54 U/L (ref 55–135)
ALT SERPL W/O P-5'-P-CCNC: 12 U/L (ref 10–44)
ANION GAP SERPL CALC-SCNC: 4 MMOL/L (ref 8–16)
AST SERPL-CCNC: 16 U/L (ref 10–40)
BASOPHILS # BLD AUTO: 0.02 K/UL (ref 0–0.2)
BASOPHILS NFR BLD: 0.3 % (ref 0–1.9)
BILIRUB SERPL-MCNC: 0.4 MG/DL (ref 0.1–1)
BLD GP AB SCN CELLS X3 SERPL QL: NORMAL
BUN SERPL-MCNC: 13 MG/DL (ref 6–20)
CALCIUM SERPL-MCNC: 9 MG/DL (ref 8.7–10.5)
CHLORIDE SERPL-SCNC: 106 MMOL/L (ref 95–110)
CO2 SERPL-SCNC: 26 MMOL/L (ref 23–29)
CREAT SERPL-MCNC: 0.8 MG/DL (ref 0.5–1.4)
DIFFERENTIAL METHOD: ABNORMAL
EOSINOPHIL # BLD AUTO: 0.1 K/UL (ref 0–0.5)
EOSINOPHIL NFR BLD: 1.5 % (ref 0–8)
ERYTHROCYTE [DISTWIDTH] IN BLOOD BY AUTOMATED COUNT: 12.6 % (ref 11.5–14.5)
EST. GFR  (AFRICAN AMERICAN): >60 ML/MIN/1.73 M^2
EST. GFR  (NON AFRICAN AMERICAN): >60 ML/MIN/1.73 M^2
GLUCOSE SERPL-MCNC: 73 MG/DL (ref 70–110)
HCT VFR BLD AUTO: 32.6 % (ref 37–48.5)
HGB BLD-MCNC: 11 G/DL (ref 12–16)
LYMPHOCYTES # BLD AUTO: 2.3 K/UL (ref 1–4.8)
LYMPHOCYTES NFR BLD: 34.1 % (ref 18–48)
MCH RBC QN AUTO: 28.1 PG (ref 27–31)
MCHC RBC AUTO-ENTMCNC: 33.7 G/DL (ref 32–36)
MCV RBC AUTO: 83 FL (ref 82–98)
MONOCYTES # BLD AUTO: 0.4 K/UL (ref 0.3–1)
MONOCYTES NFR BLD: 5.6 % (ref 4–15)
NEUTROPHILS # BLD AUTO: 4 K/UL (ref 1.8–7.7)
NEUTROPHILS NFR BLD: 58.6 % (ref 38–73)
PLATELET # BLD AUTO: 278 K/UL (ref 150–350)
PMV BLD AUTO: 9.3 FL (ref 9.2–12.9)
POTASSIUM SERPL-SCNC: 4 MMOL/L (ref 3.5–5.1)
PROT SERPL-MCNC: 6.8 G/DL (ref 6–8.4)
RBC # BLD AUTO: 3.92 M/UL (ref 4–5.4)
SODIUM SERPL-SCNC: 136 MMOL/L (ref 136–145)
WBC # BLD AUTO: 6.8 K/UL (ref 3.9–12.7)

## 2019-09-13 PROCEDURE — 83020 HEMOGLOBIN ELECTROPHORESIS: CPT

## 2019-09-13 PROCEDURE — 85025 COMPLETE CBC W/AUTO DIFF WBC: CPT

## 2019-09-13 PROCEDURE — 84163 PAPPA SERUM: CPT

## 2019-09-13 PROCEDURE — 99999 PR PBB SHADOW E&M-EST. PATIENT-LVL II: ICD-10-PCS | Mod: PBBFAC,,,

## 2019-09-13 PROCEDURE — 86592 SYPHILIS TEST NON-TREP QUAL: CPT

## 2019-09-13 PROCEDURE — 86850 RBC ANTIBODY SCREEN: CPT

## 2019-09-13 PROCEDURE — 99212 OFFICE O/P EST SF 10 MIN: CPT | Mod: PBBFAC,27,TH,25 | Performed by: OBSTETRICS & GYNECOLOGY

## 2019-09-13 PROCEDURE — 99215 PR OFFICE/OUTPT VISIT, EST, LEVL V, 40-54 MIN: ICD-10-PCS | Mod: TH,S$PBB,, | Performed by: OBSTETRICS & GYNECOLOGY

## 2019-09-13 PROCEDURE — 99215 OFFICE O/P EST HI 40 MIN: CPT | Mod: TH,S$PBB,, | Performed by: OBSTETRICS & GYNECOLOGY

## 2019-09-13 PROCEDURE — 86762 RUBELLA ANTIBODY: CPT

## 2019-09-13 PROCEDURE — 80053 COMPREHEN METABOLIC PANEL: CPT

## 2019-09-13 PROCEDURE — 90471 IMMUNIZATION ADMIN: CPT | Mod: PBBFAC,VFC

## 2019-09-13 PROCEDURE — 80074 ACUTE HEPATITIS PANEL: CPT

## 2019-09-13 PROCEDURE — 36415 COLL VENOUS BLD VENIPUNCTURE: CPT

## 2019-09-13 PROCEDURE — 99999 PR PBB SHADOW E&M-EST. PATIENT-LVL II: CPT | Mod: PBBFAC,,, | Performed by: OBSTETRICS & GYNECOLOGY

## 2019-09-13 PROCEDURE — 86703 HIV-1/HIV-2 1 RESULT ANTBDY: CPT

## 2019-09-13 PROCEDURE — 99999 PR PBB SHADOW E&M-EST. PATIENT-LVL II: CPT | Mod: PBBFAC,,,

## 2019-09-13 PROCEDURE — 99999 PR PBB SHADOW E&M-EST. PATIENT-LVL II: ICD-10-PCS | Mod: PBBFAC,,, | Performed by: OBSTETRICS & GYNECOLOGY

## 2019-09-13 PROCEDURE — 99212 OFFICE O/P EST SF 10 MIN: CPT | Mod: PBBFAC,25

## 2019-09-13 NOTE — PROGRESS NOTES
PT ARRIVED @ 1001   , PT'S ORIGINAL APPT WAS @  1000 , PT WAS ROOMED @  1004 ( PT IS A LAST MIN ADD ON / SHE HAS SEEN HER DR FIRST)    Flu shot given via  r deltoid w/o incident, flu shot handout given to pt, instructed pt to wait in the waiting room for 10-15 mins in case of an immediate adverse reaction

## 2019-09-13 NOTE — PROGRESS NOTES
Complaints today: Ms. Islas reports that she is doing well but she does report a week of nosebleeds. She reports that she noticed them after she had taken a shower. She denies extreme heat in the bathroom while showering. She reports occasional nausea but she is able to tolerate her diet. She still does not feel any fetal movement but denies vaginal bleeding, LOF and contractions at this time.      /63   Wt 57.2 kg (125 lb 15.9 oz)   LMP 2019   BMI 24.61 kg/m²     19 y.o., at 15w4d by Estimated Date of Delivery: 3/2/20  Patient Active Problem List   Diagnosis    Supervision of normal first pregnancy, antepartum     OB History    Para Term  AB Living   1 0 0 0 0 0   SAB TAB Ectopic Multiple Live Births   0 0 0 0 0      # Outcome Date GA Lbr Chad/2nd Weight Sex Delivery Anes PTL Lv   1 Current                Dating reviewed    Allergies and problem list reviewed and updated    Medical and surgical history reviewed    Prenatal labs reviewed and updated    Physical Exam:  ABD: soft, gravid, nontender, 16cm    Assessment:  Earnest was seen today for routine prenatal visit.    Diagnoses and all orders for this visit:    Supervision of normal first pregnancy, antepartum  - Doing well  - Influenza shot today  - [facility] US in October    Epistaxis  - Discussed how common nosebleeds in pregnancy is with patient  - Discussed ways to stop nosebleeds when she is having them which includes but is not limited to:   Sitting or standing, keep your head upright. This reduces the pressure in the blood vessels inside your nose and will help to slow down the bleeding.     Pinch the soft part of your nose, underneath the bony ridge, between your thumb and forefinger. Once you have done this, the two sides of your nose should be pressed together.      Keep pinching, without releasing, for 10 minutes.      If your nose is bleeding a lot, you may want to lean slightly forward and breathe through your mouth so the  blood runs out of your nose, rather than down the back of your throat.     After 5-10 minutes, gently release your pinch to see if the bleeding has stopped.     If your nose is still bleeding, continue pinching your nose but also head to the ED      No orders of the defined types were placed in this encounter.      Follow up in about 4 weeks (around 10/11/2019) for routine OB.

## 2019-09-13 NOTE — PATIENT INSTRUCTIONS
Influenza (Flu) and Pregnancy  Influenza (the flu) is an infection of the respiratory tract. The tract is made up of your mouth, nose, and lungs, and the passages between them. The flu can make a pregnant woman very ill. This is because pregnant women are at high risk for flu complications. These complications include sinus infections and serious lung infections such as bronchitis and pneumonia. In rare cases, the flu can lead to miscarriage of the baby or even death of the mother. This sheet tells you more about the flu, what to do if you come down with the flu, and what you can do to avoid infection.     Washing your hands often with soap and water can help keep you from catching the flu virus.   Who is at risk for the flu?  Anyone can get the flu. But you are more likely to catch the flu if you:  · Are often around young children  · Work in a healthcare setting where you may be exposed to flu germs  · Live or work with someone who has the flu  · Havent had an annual flu shot  How does the flu spread?  The flu is caused by a type of germ called a virus. The germ spreads through the air in droplets when someone who has the flu coughs, sneezes, laughs, or talks. You can become infected when you inhale the germ directly. You can also become infected when you touch a surface on which the droplets have landed and then touch your eyes, nose, or mouth. Touching used tissues, or sharing utensils, drinking glasses, or a toothbrush with an infected person can expose you to the flu germ, too.  What are the symptoms of the flu?  Flu symptoms tend to come on quickly and may last a few days to a few weeks. They include:  · Fever that's usually higher than 100.4°F (38°C) and chills  · Sore throat and headache  · Dry cough  · Runny nose  · Tiredness and weakness  · Body and muscle aches  If you are pregnant and have flu-like symptoms  Here are some suggestions:  · Call your healthcare provider right away. Follow any  instructions your healthcare provider gives you.  · You may be asked to get tested to confirm that you have the flu.  · Your healthcare provider may prescribe medicines called antivirals. These medicines must be taken within 2 days of when your symptoms started. In some cases, your healthcare provider may not wait for test results to come back before starting you on antivirals. These medicines work by stopping the flu virus from reproducing in your body. This gives your bodys immune system a chance to fight the virus. After taking the medicine, your symptoms may be milder and you may recover quicker than without the medicine. The medicine may also prevent serious complications, like pneumonia.  · If you feel you need medicines to relieve symptoms, ask your healthcare provider which ones are safe for you to take.  Easing flu symptoms  · Drink lots of fluids such as water, juice, and warm soup to prevent dehydration. A good rule is to drink enough so that you urinate your normal amount. Feeling dizzy or lightheaded most likely means you need to drink more fluid.  · Get plenty of rest.  · If you aren't hungry, eat smaller meals more often during the day to make sure you get enough calories.  · If you don't have a fever, put warm compresses on your forehead or sinuses to relieve congestion.  · Call your healthcare provider if you become short of breath.  Preventing the flu  · Get vaccinated. One of the best ways to avoid the flu is to get a flu vaccine. Pregnant women can safely get a flu shot. But pregnant women should not get the nasal spray vaccine for the flu. This is a live-virus vaccine and may be harmful to the baby. The nasal spray is not recommended for the 8668-8052 flu season. The CDC says this is because the nasal spray did not seem to protect against the flu over the last several flu seasons. In the past, it was meant for non-pregnant people ages 2 to 49.  · Wash your hands often. Frequent handwashing is a  proven way to prevent infection. Carry an alcohol-based hand gel containing at least 60% alcohol. Use it when you dont have access to soap and water.  · Clean items you use often with disinfectant wipes. This includes phones, computer keyboards, and toys.  · Avoid crowds and children as much as possible while you are pregnant. Stay away from anyone who has the flu.  Tips for good handwashing  Handwashing is one of the best ways to prevent many common infections. Follow these steps for more effective handwashing:  · Use warm water and plenty of soap. Work up a good lather.  · Clean the whole hand, under your nails, between your fingers, and up the wrists.  · Wash for at least 20 seconds. Dont just wipe--scrub well.  · Rinse, letting the water run down your fingers, not up your wrists.  · Dry your hands well. Use a paper towel to turn off the faucet and open the door.  Using alcohol-based hand gels  Alcohol-based hand gels are also a good choice for cleaning your hands. Use them when you dont have access to soap and water, or your hands don't look dirty. Follow these steps:  · Squeeze about a tablespoon of gel into the palm of one hand.  · Rub your hands together briskly, cleaning the backs of your hands, the palms, between your fingers, and up the wrists.  · Rub until the gel is gone and your hands are completely dry.   Date Last Reviewed: 8/9/2015  © 1496-1876 [employer]. [address] [place] [address], [place] [address]. All rights reserved. This information is not intended as a substitute for professional medical care. Always follow your healthcare professional's instructions.        Adapting to Pregnancy: Second Trimester  Keep up the healthy habits you started in your first trimester. You might be a little more tired than normal. So plan your day wisely. Look at the tips below and choose the ones that suit your lifestyle.    If you have any questions, check with your healthcare provider.   If you  work  If you can, adjust your work with your employer to fit your needs. Try these tips:  · If you stand for long periods, find ways to do some tasks while sitting. Also, try to stand with 1 foot resting on a low stool or ledge. Shift your weight from foot to foot often. Wear low-heeled shoes.  · If you sit, keep your knees level with your hips. Rest your feet on a firm surface. Sit tall with support for your low back.  · If you work long hours, ask about adjusting your schedule. Try taking shorter breaks more often.  When you travel  The second trimester may be the best time for any travel. Talk to your healthcare provider about any special plans you may need to make. Always:  · Wear a seat belt. Fasten the lap part under your belly. Wear the shoulder part also.  · Take breaks often during long trips by car or plane. Move around to stretch your legs.  · Drink plenty of fluids on flights. The air in plane cabins is very dry.  · Avoid hot climates or high altitudes if you are not used to them.  · Avoid places where the food and water might make you sick.  · Make sure you are up-to-date on all immunizations, including the flu vaccine. This is especially important when traveling overseas.  Taking time to relax  Find time to rest and relax at work or at home:  · Take short time-outs daily. Do relaxation exercises.  · Breathe deeply during stressful times.  · Try not to take on too much. Plan tasks for times when you have the most energy.  · Take naps when you can. Or just sit and relax.  · After week 16, avoid lying on your back for more than a few minutes. Instead, lie on your side. Switch sides often.  Continuing as lovers  Unless your healthcare provider tells you otherwise, there is no reason to stop having sex now. Blood supply increases to the pelvic area in the second trimester. Because of this, sex might be more enjoyable. Try different positions and see whats best. Also, talk to your partner about any changes  in desire. Spotting may happen after sex. Be sure to let your healthcare provider know if there is heavy bleeding.  Keeping your environment safe  You can still clean house and use scented products. Just take some simple precautions:  · Wear gloves when using cleaning fluids.  · Open windows to let in fresh air. Use a fan if you paint.  · Avoid secondhand smoke.  · Dont breathe fumes from nail polish, hair spray, cleansers, or other chemicals.  Date Last Reviewed: 8/16/2015  © 3528-1233 [employer]. [address] [place] [address]. All rights reserved. This information is not intended as a substitute for professional medical care. Always follow your healthcare professional's instructions.

## 2019-09-14 LAB — RPR SER QL: NORMAL

## 2019-09-16 LAB
HAV IGM SERPL QL IA: NEGATIVE
HBV CORE IGM SERPL QL IA: NEGATIVE
HBV SURFACE AG SERPL QL IA: NEGATIVE
HCV AB SERPL QL IA: NEGATIVE
HIV 1+2 AB+HIV1 P24 AG SERPL QL IA: NEGATIVE
INTEGRATED SCN PATIENT-IMP NAR: NORMAL
RUBV IGG SER-ACNC: 27.8 IU/ML
RUBV IGG SER-IMP: REACTIVE

## 2019-09-17 LAB
HGB A2 MFR BLD HPLC: 2.8 % (ref 2.2–3.2)
HGB FRACT BLD ELPH-IMP: NORMAL
HGB FRACT BLD ELPH-IMP: NORMAL

## 2019-10-10 ENCOUNTER — PROCEDURE VISIT (OUTPATIENT)
Dept: MATERNAL FETAL MEDICINE | Facility: CLINIC | Age: 19
End: 2019-10-10
Payer: MEDICAID

## 2019-10-10 DIAGNOSIS — Z36.3 ANTENATAL SCREENING FOR MALFORMATION USING ULTRASONICS: ICD-10-CM

## 2019-10-10 DIAGNOSIS — Z36.4 ANTENATAL SCREENING FOR FETAL GROWTH RETARDATION USING ULTRASONICS: ICD-10-CM

## 2019-10-10 DIAGNOSIS — Z34.00 SUPERVISION OF NORMAL FIRST PREGNANCY, ANTEPARTUM: ICD-10-CM

## 2019-10-10 DIAGNOSIS — Z3A.19 19 WEEKS GESTATION OF PREGNANCY: ICD-10-CM

## 2019-10-10 PROCEDURE — 76805 PR US, OB 14+WKS, TRANSABD, SINGLE GESTATION: ICD-10-PCS | Mod: 26,S$PBB,, | Performed by: OBSTETRICS & GYNECOLOGY

## 2019-10-10 PROCEDURE — 99499 NO LOS: ICD-10-PCS | Mod: S$PBB,,, | Performed by: OBSTETRICS & GYNECOLOGY

## 2019-10-10 PROCEDURE — 76805 OB US >/= 14 WKS SNGL FETUS: CPT | Mod: 26,S$PBB,, | Performed by: OBSTETRICS & GYNECOLOGY

## 2019-10-10 PROCEDURE — 76805 OB US >/= 14 WKS SNGL FETUS: CPT | Mod: PBBFAC,PO | Performed by: OBSTETRICS & GYNECOLOGY

## 2019-10-10 PROCEDURE — 99499 UNLISTED E&M SERVICE: CPT | Mod: S$PBB,,, | Performed by: OBSTETRICS & GYNECOLOGY

## 2019-11-05 ENCOUNTER — TELEPHONE (OUTPATIENT)
Dept: OBSTETRICS AND GYNECOLOGY | Facility: CLINIC | Age: 19
End: 2019-11-05

## 2019-11-05 NOTE — TELEPHONE ENCOUNTER
Spoke with pt appointment made. She no showed last 2 appointments, was advised to rest drink plenty of water and can try taking a hot bath for pain     ----- Message from Karli Linton sent at 11/5/2019  1:40 PM CST -----  Contact: Self   Type: Patient Call Back    Who called: Self     What is the request in detail:patient states she is 5mth pregnant and she has back pains and hip pains. Please call     Can the clinic reply by [facility]? No     Would the patient rather a call back or a response via [facility]? Call     Best call back number:[phone]

## 2019-11-07 ENCOUNTER — ROUTINE PRENATAL (OUTPATIENT)
Dept: OBSTETRICS AND GYNECOLOGY | Facility: CLINIC | Age: 19
End: 2019-11-07
Payer: MEDICAID

## 2019-11-07 VITALS
WEIGHT: 134.06 LBS | BODY MASS INDEX: 26.18 KG/M2 | DIASTOLIC BLOOD PRESSURE: 54 MMHG | SYSTOLIC BLOOD PRESSURE: 108 MMHG

## 2019-11-07 DIAGNOSIS — R10.2 VAGINAL PAIN: ICD-10-CM

## 2019-11-07 DIAGNOSIS — Z34.00 SUPERVISION OF NORMAL FIRST PREGNANCY, ANTEPARTUM: Primary | ICD-10-CM

## 2019-11-07 PROCEDURE — 99212 OFFICE O/P EST SF 10 MIN: CPT | Mod: PBBFAC,TH | Performed by: OBSTETRICS & GYNECOLOGY

## 2019-11-07 PROCEDURE — 99215 OFFICE O/P EST HI 40 MIN: CPT | Mod: TH,S$PBB,, | Performed by: OBSTETRICS & GYNECOLOGY

## 2019-11-07 PROCEDURE — 87086 URINE CULTURE/COLONY COUNT: CPT

## 2019-11-07 PROCEDURE — 99999 PR PBB SHADOW E&M-EST. PATIENT-LVL II: CPT | Mod: PBBFAC,,, | Performed by: OBSTETRICS & GYNECOLOGY

## 2019-11-07 PROCEDURE — 99215 PR OFFICE/OUTPT VISIT, EST, LEVL V, 40-54 MIN: ICD-10-PCS | Mod: TH,S$PBB,, | Performed by: OBSTETRICS & GYNECOLOGY

## 2019-11-07 PROCEDURE — 87481 CANDIDA DNA AMP PROBE: CPT | Mod: 59

## 2019-11-07 PROCEDURE — 87801 DETECT AGNT MULT DNA AMPLI: CPT

## 2019-11-07 PROCEDURE — 99999 PR PBB SHADOW E&M-EST. PATIENT-LVL II: ICD-10-PCS | Mod: PBBFAC,,, | Performed by: OBSTETRICS & GYNECOLOGY

## 2019-11-07 NOTE — PATIENT INSTRUCTIONS
Pelvic Pain in Pregnancy: Unclear (2-3 Trimester)  You are well into your pregnancy and are having pain and pressure in your pelvic area. This is your lower belly (abdomen). Mild pelvic pressure or heaviness is very common in the latter stages of a healthy pregnancy. This is due to the growing uterus (womb). Although the exact cause of your pain is not certain, it does not appear to be dangerous. It may be due to ligaments in your belly stretching to support your uterus as it grows. The weight of your baby may also be causing pressure and pain. Pain can be caused by the bones of your pelvis shifting as your body makes room for the baby to pass through. This is known as symphysis pubis dysfunction (SPD). SPD can cause quite a bit of pain and discomfort as the due date nears.     Pain in the low back is common during pregnancy.   Home care  The following are general care guidelines:  · Avoid strenuous activities and long periods of standing. Bed rest is not needed unless your doctor has recommended it.  · Exercise for 30 minutes all or most days of the week. This will promote muscle tone, strength, and endurance. Ask your healthcare provider what exercises to do and to avoid.  · Sit in a warm (NOT hot) bath. This helps relax tight, painful muscles.  · Sleep on your side with a pillow between your legs. This helps align your pelvis.  · Eat frequent, light meals. Choose foods that are easy to digest.  · Ask your doctor whether a pregnancy support belt could help you.  · If told to by your healthcare provider, take an over-the-counter medicine, such as acetaminophen, to relieve pain. Follow instructions carefully for how much to take and how often to take it. Do not take aspirin or nonsteroidal anti-inflammatory medicines (like ibuprofen) unless you have been told to do so by your healthcare provider.  Follow-up care  Follow up with your healthcare provider, or as advised.  When to seek medical advice  Call your  healthcare provider right away if any of these occur:   · Sudden or gradual worsening abdominal pain  · Fainting, dizziness, or weakness when standing  · Any vaginal bleeding  · Leakage of fluid from the vagina  · Decreased fetal motion  · Repeated vomiting or diarrhea  · Pain that seems to settle in one area, especially the lower right abdomen  · Blood in vomit or bowel movements (dark red or black color)  · Fever of 100.4°F (38°C) or higher  Date Last Reviewed: 6/1/2016  © 6842-2841 [employer]. [address] [place] [address], [place] [address]. All rights reserved. This information is not intended as a substitute for professional medical care. Always follow your healthcare professional's instructions.

## 2019-11-07 NOTE — PROGRESS NOTES
Complaints today: Ms. Islas reports that she has been having vaginal pain and knee pain. She reports that she is not sure if she has contractions. She reports normal fetal movements with no vaginal bleeding, LOF at this time.       BP (!) 108/54   Wt 60.8 kg (134 lb 0.6 oz)   LMP 2019   BMI 26.18 kg/m²     19 y.o., at 23w3d by Estimated Date of Delivery: 3/2/20  Patient Active Problem List   Diagnosis    Supervision of normal first pregnancy, antepartum     OB History    Para Term  AB Living   1 0 0 0 0 0   SAB TAB Ectopic Multiple Live Births   0 0 0 0 0      # Outcome Date GA Lbr Chad/2nd Weight Sex Delivery Anes PTL Lv   1 Current                Dating reviewed    Allergies and problem list reviewed and updated    Medical and surgical history reviewed    Prenatal labs reviewed and updated    Physical Exam:  ABD: soft, gravid, nontender, 24CM    Assessment:  Earnest was seen today for routine prenatal visit.    Diagnoses and all orders for this visit:    Supervision of normal first pregnancy, antepartum  -     OB Glucose Screen; Future  - Reviewed MFM US and labs  - Discussed the importance of coming to ob appointments    Vaginal pain  -     Urine culture  -     Vaginosis Screen by DNA Probe  - Discussed using maternity belts to help with vaginal/help pain        Orders Placed This Encounter   Procedures    Urine culture    Vaginosis Screen by DNA Probe    OB Glucose Screen       Follow up in about 3 weeks (around 2019) for routine OB.

## 2019-11-08 LAB
BACTERIAL VAGINOSIS DNA: NEGATIVE
CANDIDA GLABRATA DNA: NEGATIVE
CANDIDA KRUSEI DNA: NEGATIVE
CANDIDA RRNA VAG QL PROBE: NEGATIVE
T VAGINALIS RRNA GENITAL QL PROBE: NEGATIVE

## 2019-11-09 LAB — BACTERIA UR CULT: NORMAL

## 2019-11-27 DIAGNOSIS — Z32.01 PREGNANCY CONFIRMED BY POSITIVE URINE TEST: ICD-10-CM

## 2019-11-27 NOTE — TELEPHONE ENCOUNTER
Type: RX Refill Request     Who Called: self     Refill or New Rx:refill     RX Name and Strength:prenatal 26-iron ps-folic-dha (VITAFOL-ONE) 29 mg iron- 1 mg-200 mg Cap 90       Preferred Pharmacy with phone number:[id number] - [place] [facility] [address] [phone] (Phone)   [phone] (Fax)     Local or Mail Order:local     Ordering Provider:Taniya     Would the patient rather a call back or a response via [facility]? call     Best Call Back Number:[phone]

## 2019-12-09 ENCOUNTER — CLINICAL SUPPORT (OUTPATIENT)
Dept: OBSTETRICS AND GYNECOLOGY | Facility: CLINIC | Age: 19
End: 2019-12-09
Payer: MEDICAID

## 2019-12-09 ENCOUNTER — LAB VISIT (OUTPATIENT)
Dept: LAB | Facility: HOSPITAL | Age: 19
End: 2019-12-09
Attending: OBSTETRICS & GYNECOLOGY
Payer: MEDICAID

## 2019-12-09 ENCOUNTER — ROUTINE PRENATAL (OUTPATIENT)
Dept: OBSTETRICS AND GYNECOLOGY | Facility: CLINIC | Age: 19
End: 2019-12-09
Payer: MEDICAID

## 2019-12-09 VITALS
HEIGHT: 60 IN | BODY MASS INDEX: 26.89 KG/M2 | SYSTOLIC BLOOD PRESSURE: 116 MMHG | WEIGHT: 137.69 LBS | DIASTOLIC BLOOD PRESSURE: 59 MMHG | WEIGHT: 137.81 LBS | BODY MASS INDEX: 27.06 KG/M2

## 2019-12-09 DIAGNOSIS — Z34.00 SUPERVISION OF NORMAL FIRST PREGNANCY, ANTEPARTUM: Primary | ICD-10-CM

## 2019-12-09 DIAGNOSIS — Z34.00 SUPERVISION OF NORMAL FIRST PREGNANCY, ANTEPARTUM: ICD-10-CM

## 2019-12-09 DIAGNOSIS — Z23 NEED FOR DIPHTHERIA-TETANUS-PERTUSSIS (TDAP) VACCINE: Primary | ICD-10-CM

## 2019-12-09 DIAGNOSIS — O99.013 ANEMIA DURING PREGNANCY IN THIRD TRIMESTER: Primary | ICD-10-CM

## 2019-12-09 LAB
BASOPHILS # BLD AUTO: 0.03 K/UL (ref 0–0.2)
BASOPHILS NFR BLD: 0.3 % (ref 0–1.9)
DIFFERENTIAL METHOD: ABNORMAL
EOSINOPHIL # BLD AUTO: 0.1 K/UL (ref 0–0.5)
EOSINOPHIL NFR BLD: 0.9 % (ref 0–8)
ERYTHROCYTE [DISTWIDTH] IN BLOOD BY AUTOMATED COUNT: 12.6 % (ref 11.5–14.5)
GLUCOSE SERPL-MCNC: 102 MG/DL (ref 70–140)
HCT VFR BLD AUTO: 30.5 % (ref 37–48.5)
HGB BLD-MCNC: 10.1 G/DL (ref 12–16)
IMM GRANULOCYTES # BLD AUTO: 0.09 K/UL (ref 0–0.04)
IMM GRANULOCYTES NFR BLD AUTO: 0.9 % (ref 0–0.5)
LYMPHOCYTES # BLD AUTO: 2.5 K/UL (ref 1–4.8)
LYMPHOCYTES NFR BLD: 24.4 % (ref 18–48)
MCH RBC QN AUTO: 28.5 PG (ref 27–31)
MCHC RBC AUTO-ENTMCNC: 33.1 G/DL (ref 32–36)
MCV RBC AUTO: 86 FL (ref 82–98)
MONOCYTES # BLD AUTO: 0.4 K/UL (ref 0.3–1)
MONOCYTES NFR BLD: 4 % (ref 4–15)
NEUTROPHILS # BLD AUTO: 7 K/UL (ref 1.8–7.7)
NEUTROPHILS NFR BLD: 69.5 % (ref 38–73)
NRBC BLD-RTO: 0 /100 WBC
PLATELET # BLD AUTO: 267 K/UL (ref 150–350)
PMV BLD AUTO: 9.7 FL (ref 9.2–12.9)
RBC # BLD AUTO: 3.55 M/UL (ref 4–5.4)
WBC # BLD AUTO: 10.1 K/UL (ref 3.9–12.7)

## 2019-12-09 PROCEDURE — 85025 COMPLETE CBC W/AUTO DIFF WBC: CPT

## 2019-12-09 PROCEDURE — 36415 COLL VENOUS BLD VENIPUNCTURE: CPT

## 2019-12-09 PROCEDURE — 99215 OFFICE O/P EST HI 40 MIN: CPT | Mod: TH,S$PBB,, | Performed by: OBSTETRICS & GYNECOLOGY

## 2019-12-09 PROCEDURE — 90471 IMMUNIZATION ADMIN: CPT | Mod: PBBFAC,VFC

## 2019-12-09 PROCEDURE — 99999 PR PBB SHADOW E&M-EST. PATIENT-LVL III: CPT | Mod: PBBFAC,,, | Performed by: OBSTETRICS & GYNECOLOGY

## 2019-12-09 PROCEDURE — 99999 PR PBB SHADOW E&M-EST. PATIENT-LVL I: CPT | Mod: PBBFAC,,,

## 2019-12-09 PROCEDURE — 82950 GLUCOSE TEST: CPT

## 2019-12-09 PROCEDURE — 99999 PR PBB SHADOW E&M-EST. PATIENT-LVL I: ICD-10-PCS | Mod: PBBFAC,,,

## 2019-12-09 PROCEDURE — 99211 OFF/OP EST MAY X REQ PHY/QHP: CPT | Mod: PBBFAC,27

## 2019-12-09 PROCEDURE — 99213 OFFICE O/P EST LOW 20 MIN: CPT | Mod: PBBFAC,TH | Performed by: OBSTETRICS & GYNECOLOGY

## 2019-12-09 PROCEDURE — 99999 PR PBB SHADOW E&M-EST. PATIENT-LVL III: ICD-10-PCS | Mod: PBBFAC,,, | Performed by: OBSTETRICS & GYNECOLOGY

## 2019-12-09 PROCEDURE — 99215 PR OFFICE/OUTPT VISIT, EST, LEVL V, 40-54 MIN: ICD-10-PCS | Mod: TH,S$PBB,, | Performed by: OBSTETRICS & GYNECOLOGY

## 2019-12-09 RX ORDER — FERROUS SULFATE 325(65) MG
325 TABLET, DELAYED RELEASE (ENTERIC COATED) ORAL DAILY
Qty: 90 TABLET | Refills: 1 | Status: SHIPPED | OUTPATIENT
Start: 2019-12-09 | End: 2020-12-08

## 2019-12-09 NOTE — PROGRESS NOTES
Pt. Given Tdap vaccine to L Deltiod without any complications. Pt. Advised to sit in waiting room for 15mins. For any reactions.

## 2019-12-09 NOTE — PATIENT INSTRUCTIONS
Adapting to Pregnancy: Third Trimester    Although common during pregnancy, some discomforts may seem worse in the final weeks. Simple lifestyle changes can help. Take care of yourself. And ask your partner to help out with small tasks.  Limiting leg problems  Ways to combat leg issues:  · Wear support hose all day.  · Avoid snug shoes and clothes that bind, like tight pants and socks with elastic tops.  · Sit with your feet and legs raised often.  Caring for your breasts  Tips to follow include:  · Wash with plain water. Avoid using harsh soaps or rubbing alcohol. They may cause dryness.  · Wear a nursing bra for extra support. It can also hide any leaks from your nipples.  Controlling hemorrhoids  Ways to avoid hemorrhoids include:  · Eat foods that are high in fiber. Also, exercise and drink enough fluids. This will reduce constipation and hemorrhoids.  · Sleep and nap on your side. This limits pressure on the veins of your rectum.  · Try not to stand or sit for long periods.  Controlling back pain  As your body changes during pregnancy, your back must work in new ways. Back pain is due to many causes. Physical changes in your body can strain your back and its supporting muscles. Also, hormones (chemicals that carry messages throughout the body) increase during pregnancy. This can affect how your muscles and joints work together. All of these changes can lead to pain. Pain may be felt in the upper or lower back. Pain is also common in the pelvis. Some pregnant women have sciatica. This is pain caused by pressure on the sciatic nerve running down the back of the leg. Ask your healthcare provider for specific tips and exercises to help control your back pain.  Tips to help you rest  Good rest and sleep will help you feel better. Here are some ideas:  · Ask your partner to massage your shoulders, neck, or back.  · Limit the errands you do each day.  · Lie down in the afternoon or after work for a few  minutes.  · Take a warm bath before you go to sleep.  · Drink warm milk or teas without caffeine.  · Avoid coffee, black tea, and cola.  Stopping heartburn  · Avoid spicy or acidic foods.  · Eat small amounts more often. Eat slowly. · Wait 2 hours after eating before lying down.  · Sleep with your upper body raised 6 inches.   Managing mood swings  Ways to manage mood swings include:  · Know that mood changes are normal.  · Exercise often, but get plenty of rest.  · Address any concerns and limit stress. Talking to your partner, other women, or your healthcare provider may help.  Dealing with urinary frequency  Tips to deal with having to urinate often include:  · Drink plenty of water all day. If you drink a lot in the evening, though, you may have to get up more in the night.  · Limit coffee, black tea, and cola.  Date Last Reviewed: 8/16/2015  © 3540-6085 [employer]. [address] [place] [address]. All rights reserved. This information is not intended as a substitute for professional medical care. Always follow your healthcare professional's instructions.        Blood Glucose Screening During Pregnancy    Gestational diabetes is diabetes that only pregnant women get. Changes in your body during pregnancy can cause high blood sugar (glucose). This can cause problems for you and your baby. It is a serious condition, but it can be controlled.  Who is at risk for gestational diabetes?  You are at risk of getting gestational diabetes if any of the following risk factors apply to you. The risk for gestational diabetes becomes higher as your number of risk factors increases.  · You are , -American, , , or .  · You weigh more than your healthcare provider says is healthy for you.  · You have a relative with diabetes.  · You are older than age 25.  · You had gestational diabetes during a past pregnancy.  · You had a stillbirth or a very large  baby before.  · You have a history of abnormal glucose tolerance.  What happens during a screening  Here is what to expect during a blood glucose screening:  · While conflicting recommendations for screening exist, the American College of Obstetricians and Gynecologists currently recommends universal screening for gestational diabetes. Your risk for gestational diabetes will determine when you are screened. Women are tested at 24 to 28 weeks of pregnancy. Women at high risk may be tested when they first learn they are pregnant.  · To do the screening, a blood sample is taken and your blood sugar level is measured.  · If the results show a high blood sugar level, a glucose tolerance test may be ordered. This test measures the amount of time it takes for sugar to leave your blood. The test will determine if you have gestational diabetes.  What to know if you test positive  Here are some things you need to know:  · Gestational diabetes is treatable. The best way to control gestational diabetes is to find out you have it as early as possible and start treatment quickly.  · Gestational diabetes can cause problems for the mother during pregnancy. It can also cause problems with the baby during pregnancy, delivery, and after. Treatment greatly lowers the chance for problems.  · The changes in your body that cause gestational diabetes normally happen only when you are pregnant. After the baby is born, your body goes back to normal and the condition goes away. You may be more likely to have type 2 diabetes later, though. So talk to your healthcare provider about ways to help prevent type 2 diabetes.  Treating gestational diabetes  Here is how to treat gestational diabetes:  · Youll need to check your blood sugar regularly. You can do this at home by pricking your finger and checking a drop of blood on a glucose monitor. Your healthcare provider will show you how and when to check your blood sugar and discuss your target  blood sugar level.  · To manage your blood sugar, you will be given a special plan. It will likely involve planning your meals and getting regular exercise. Some women need to take a hormone called insulin, or an oral hypoglycemic medicine to help control their blood sugar.  Date Last Reviewed: 8/16/2015  © 3669-5035 [employer]. [address] [place] [address], [place] [address]. All rights reserved. This information is not intended as a substitute for professional medical care. Always follow your healthcare professional's instructions.

## 2019-12-09 NOTE — PROGRESS NOTES
Complaints today: Ms. Islas report that she is doing well but she continues to have back pain. She reports that she has her maternity belt but does not wear it every day.  Normal fetal movements with no vaginal bleeding, LOF or contractions at this time.       BP (!) 116/59   Wt 62.5 kg (137 lb 10.8 oz)   LMP 2019   BMI 26.89 kg/m²     19 y.o., at 28w0d by Estimated Date of Delivery: 3/2/20  Patient Active Problem List   Diagnosis    Supervision of normal first pregnancy, antepartum     OB History    Para Term  AB Living   1 0 0 0 0 0   SAB TAB Ectopic Multiple Live Births   0 0 0 0 0      # Outcome Date GA Lbr Chad/2nd Weight Sex Delivery Anes PTL Lv   1 Current                Dating reviewed    Allergies and problem list reviewed and updated    Medical and surgical history reviewed    Prenatal labs reviewed and updated    Physical Exam:  ABD: soft, gravid, nontender, 28cm    Assessment:  Earnest was seen today for routine prenatal visit.    Diagnoses and all orders for this visit:    Supervision of normal first pregnancy, antepartum  -     OB Glucose Screen; Future  -     CBC auto differential; Future  - TDaP today   - Discussed contraception with patient. She would like Depo Provera   - Patient and partner given a list of Pediatrician     Orders Placed This Encounter   Procedures    OB Glucose Screen    CBC auto differential       Follow up in about 2 weeks (around 2019) for routine OB.

## 2019-12-09 NOTE — PROGRESS NOTES
Pt. Is here for a Routine ob pt. Says she feeling fine other than the right hip pain she been having for a couple weeks now

## 2019-12-28 ENCOUNTER — HOSPITAL ENCOUNTER (OUTPATIENT)
Facility: HOSPITAL | Age: 19
Discharge: HOME | End: 2019-12-28
Attending: OBSTETRICS & GYNECOLOGY | Admitting: OBSTETRICS & GYNECOLOGY
Payer: MEDICAID

## 2019-12-28 VITALS
SYSTOLIC BLOOD PRESSURE: 108 MMHG | DIASTOLIC BLOOD PRESSURE: 62 MMHG | RESPIRATION RATE: 19 BRPM | HEART RATE: 80 BPM | TEMPERATURE: 99 F

## 2019-12-28 DIAGNOSIS — R11.2 NAUSEA & VOMITING: ICD-10-CM

## 2019-12-28 LAB — FIBRONECTIN FETAL SPEC QL: NEGATIVE

## 2019-12-28 PROCEDURE — 63600175 PHARM REV CODE 636 W HCPCS: Performed by: OBSTETRICS & GYNECOLOGY

## 2019-12-28 PROCEDURE — 96360 HYDRATION IV INFUSION INIT: CPT

## 2019-12-28 PROCEDURE — 99211 OFF/OP EST MAY X REQ PHY/QHP: CPT | Mod: 25

## 2019-12-28 PROCEDURE — 82731 ASSAY OF FETAL FIBRONECTIN: CPT

## 2019-12-28 RX ORDER — SODIUM CHLORIDE, SODIUM LACTATE, POTASSIUM CHLORIDE, CALCIUM CHLORIDE 600; 310; 30; 20 MG/100ML; MG/100ML; MG/100ML; MG/100ML
INJECTION, SOLUTION INTRAVENOUS CONTINUOUS
Status: DISCONTINUED | OUTPATIENT
Start: 2019-12-28 | End: 2019-12-28 | Stop reason: HOSPADM

## 2019-12-28 RX ADMIN — SODIUM CHLORIDE, SODIUM LACTATE, POTASSIUM CHLORIDE, AND CALCIUM CHLORIDE: .6; .31; .03; .02 INJECTION, SOLUTION INTRAVENOUS at 02:12

## 2019-12-28 NOTE — NURSING
Presented to L & D with c/o n/v & right inguinal pain that she rates 4/10. EFM initiated, will observe for  labor.

## 2019-12-28 NOTE — NURSING
Dr. Monahan informed of ctxs spacing out to only a few per hour, pt feeling better, negative FFN & no cervical dilitation.

## 2019-12-28 NOTE — NURSING
Verbalized understanding of discharge instructions, written copy to be given ( labor precautions, ROM, decreased fetal movement & vaginal bleeding & ctxs mor than 4-5 per hour). See order for discharge order.

## 2019-12-28 NOTE — DISCHARGE INSTRUCTIONS
Home Undelivered Discharge Instructions    After Discharge Orders:    Future Appointments   Date Time Provider Department Center   1/2/2020  9:00 AM Aletha Pang MD [facility]       Call physician or midwife's office on keep appointment as acheduled, for instructions.    Current Discharge Medication List      CONTINUE these medications which have NOT CHANGED    Details   ferrous sulfate 325 (65 FE) MG EC tablet Take 1 tablet (325 mg total) by mouth once daily.  Qty: 90 tablet, Refills: 1    Associated Diagnoses: Anemia during pregnancy in third trimester      metoclopramide HCl (REGLAN) 5 MG tablet Take 1 tablet (5 mg total) by mouth 4 (four) times daily as needed (nausea).  Qty: 40 tablet, Refills: 0    Associated Diagnoses: Nausea and vomiting in pregnancy      ondansetron (ZOFRAN-ODT) 4 MG TbDL Take 2 tablets (8 mg total) by mouth every 8 (eight) hours.  Qty: 20 tablet, Refills: 0    Associated Diagnoses: Nausea and vomiting in pregnancy      prenatal 26-iron ps-folic-dha (VITAFOL-ONE) 29 mg iron- 1 mg-200 mg Cap Take 1 capsule by mouth once daily.  Qty: 90 capsule, Refills: 3    Associated Diagnoses: Pregnancy confirmed by positive urine test                     · Diet:  normal diet as tolerated    · Rest: normal activity as tolerated    Other instructions: Do kick counts once a day on your baby. Choose the time of day your baby is most active. Get in a comfortable lying or sitting position and time how long it takes to feel 10 kicks, twists, turns, swishes, or rolls. Call your physician or midwife if there have not been 10 kicks in 1 hours    Call physician or midwife, return to Labor and Delivery, call [phone], or go to the nearest Emergency Room if: {call doctor:[phone]}

## 2020-01-02 ENCOUNTER — ROUTINE PRENATAL (OUTPATIENT)
Dept: OBSTETRICS AND GYNECOLOGY | Facility: CLINIC | Age: 20
End: 2020-01-02
Payer: MEDICAID

## 2020-01-02 VITALS
BODY MASS INDEX: 27.45 KG/M2 | SYSTOLIC BLOOD PRESSURE: 132 MMHG | DIASTOLIC BLOOD PRESSURE: 59 MMHG | WEIGHT: 140.56 LBS

## 2020-01-02 DIAGNOSIS — Z34.00 SUPERVISION OF NORMAL FIRST PREGNANCY, ANTEPARTUM: Primary | ICD-10-CM

## 2020-01-02 DIAGNOSIS — O99.013 ANEMIA DURING PREGNANCY IN THIRD TRIMESTER: ICD-10-CM

## 2020-01-02 DIAGNOSIS — R10.2 PELVIC CRAMPING IN ANTEPARTUM PERIOD: ICD-10-CM

## 2020-01-02 DIAGNOSIS — F32.A DEPRESSION AFFECTING PREGNANCY IN THIRD TRIMESTER, ANTEPARTUM: ICD-10-CM

## 2020-01-02 DIAGNOSIS — O23.43 URINARY TRACT INFECTION IN MOTHER DURING THIRD TRIMESTER OF PREGNANCY: ICD-10-CM

## 2020-01-02 DIAGNOSIS — O99.343 DEPRESSION AFFECTING PREGNANCY IN THIRD TRIMESTER, ANTEPARTUM: ICD-10-CM

## 2020-01-02 DIAGNOSIS — O26.899 PELVIC CRAMPING IN ANTEPARTUM PERIOD: ICD-10-CM

## 2020-01-02 LAB
BILIRUB SERPL-MCNC: NEGATIVE MG/DL
BLOOD URINE, POC: NEGATIVE
COLOR, POC UA: YELLOW
GLUCOSE UR QL STRIP: NORMAL
KETONES UR QL STRIP: NEGATIVE
LEUKOCYTE ESTERASE URINE, POC: ABNORMAL
NITRITE, POC UA: ABNORMAL
PH, POC UA: 6
PROTEIN, POC: NEGATIVE
SPECIFIC GRAVITY, POC UA: 1.02
UROBILINOGEN, POC UA: NORMAL

## 2020-01-02 PROCEDURE — 81002 URINALYSIS NONAUTO W/O SCOPE: CPT | Mod: PBBFAC | Performed by: OBSTETRICS & GYNECOLOGY

## 2020-01-02 PROCEDURE — 99215 PR OFFICE/OUTPT VISIT, EST, LEVL V, 40-54 MIN: ICD-10-PCS | Mod: TH,S$PBB,, | Performed by: OBSTETRICS & GYNECOLOGY

## 2020-01-02 PROCEDURE — 87491 CHLMYD TRACH DNA AMP PROBE: CPT | Mod: 59

## 2020-01-02 PROCEDURE — 87086 URINE CULTURE/COLONY COUNT: CPT

## 2020-01-02 PROCEDURE — 99999 PR PBB SHADOW E&M-EST. PATIENT-LVL III: ICD-10-PCS | Mod: PBBFAC,,, | Performed by: OBSTETRICS & GYNECOLOGY

## 2020-01-02 PROCEDURE — 99215 OFFICE O/P EST HI 40 MIN: CPT | Mod: TH,S$PBB,, | Performed by: OBSTETRICS & GYNECOLOGY

## 2020-01-02 PROCEDURE — 99999 PR PBB SHADOW E&M-EST. PATIENT-LVL III: CPT | Mod: PBBFAC,,, | Performed by: OBSTETRICS & GYNECOLOGY

## 2020-01-02 PROCEDURE — 99213 OFFICE O/P EST LOW 20 MIN: CPT | Mod: PBBFAC,TH | Performed by: OBSTETRICS & GYNECOLOGY

## 2020-01-02 PROCEDURE — 87481 CANDIDA DNA AMP PROBE: CPT | Mod: 59

## 2020-01-02 RX ORDER — SERTRALINE HYDROCHLORIDE 50 MG/1
50 TABLET, FILM COATED ORAL DAILY
Qty: 30 TABLET | Refills: 11 | Status: ON HOLD | OUTPATIENT
Start: 2020-01-02 | End: 2020-03-03 | Stop reason: HOSPADM

## 2020-01-02 RX ORDER — CEPHALEXIN 500 MG/1
500 CAPSULE ORAL EVERY 12 HOURS
Qty: 14 CAPSULE | Refills: 0 | Status: SHIPPED | OUTPATIENT
Start: 2020-01-02 | End: 2020-01-09

## 2020-01-02 NOTE — PROGRESS NOTES
Complaints today: lily Islas comes in today c/o lower, midline back pain, pelvic cramping or contractions and anxiety. She reports that she has been having contractions for the last week or so. She was seen in triage but with no signs of labor. FFN negative. She reports that she has lower back pain but does not wear her maternity belt as she is causes her to feel claustrophobic. She reports that she has been having more anxiety. She reports that she normally smokes marijuana to help with her issues but since she cannot she has been having trouble coping. She reports that her brother was killed last year and she is still having trouble with processing his death. She reports that she does not have a plan to harm herself or anyone else but thought in the moment has crossed her mind.        Normal fetal movements with no vaginal bleeding, LOF or contractions at this time.     BP (!) 132/59   Wt 63.7 kg (140 lb 8.7 oz)   LMP 2019   BMI 27.45 kg/m²     19 y.o., at 31w3d by Estimated Date of Delivery: 3/2/20  Patient Active Problem List   Diagnosis    Supervision of normal first pregnancy, antepartum    Nausea & vomiting    Anemia during pregnancy in third trimester     OB History    Para Term  AB Living   1 0 0 0 0 0   SAB TAB Ectopic Multiple Live Births   0 0 0 0 0      # Outcome Date GA Lbr Chad/2nd Weight Sex Delivery Anes PTL Lv   1 Current                Dating reviewed    Allergies and problem list reviewed and updated    Medical and surgical history reviewed    Prenatal labs reviewed and updated    Physical Exam:  ABD: soft, gravid, nontender, 32cm    Assessment:  Earnest was seen today for routine prenatal visit.    Diagnoses and all orders for this visit:    Supervision of normal first pregnancy, antepartum  - Fundal height normal  - Concerns addressed    Anemia during pregnancy in third trimester  - Discussed with patient to take iron daily    Pelvic cramping in antepartum period  -      Vaginosis Screen by DNA Probe  -     C. trachomatis/N. gonorrhoeae by AMP DNA  -     POCT URINE DIPSTICK WITHOUT MICROSCOPE  -     Urine culture    Urinary tract infection in mother during third trimester of pregnancy  -     Urine culture  -     cephALEXin (KEFLEX) 500 MG capsule; Take 1 capsule (500 mg total) by mouth every 12 (twelve) hours. for 7 days    Depression affecting pregnancy in third trimester, antepartum  -     sertraline (ZOLOFT) 50 MG tablet; Take 1 tablet (50 mg total) by mouth once daily.  - Strongly encouraged patient to go directly to the ED if she has thoughts of harming herself or anyone else  - Number [facility] to [facility] and [facility] to make an appt with psychiatrist.       Orders Placed This Encounter   Procedures    Vaginosis Screen by DNA Probe    C. trachomatis/N. gonorrhoeae by AMP DNA    Urine culture    POCT URINE DIPSTICK WITHOUT MICROSCOPE       Follow up in about 2 weeks (around 1/16/2020) for routine OB.

## 2020-01-02 NOTE — PROGRESS NOTES
Pt. Is here for a Routine ob pt said she is having back pain, her anxiety is getting worse by the day, and feels like she's having contractions   Baby friendly video played and paper handout given to pt

## 2020-01-02 NOTE — PATIENT INSTRUCTIONS
Pregnancy: Your Third Trimester Changes  As the baby grows, your body changes too. You may also see signs that your body is getting ready for labor. Be patient. Within a few more weeks, your baby will be born.    How you are changing  Your body is preparing for the birth of your baby. Some of the most common changes are listed below. If you have any questions or concerns, ask your healthcare provider:  · Youll gain more weight from fluids, extra blood, and fat deposits.  · Your breasts will grow as your body gets ready to feed the baby. They may be more tender. You may also notice a slight yellow or white discharge from the nipples.  · Discharge from your vagina may increase. This is normal.  · You might see some skin color changes on your forehead, cheeks, or nose. Most of these will go away after you deliver.  How your baby is growing    Month 7  Your baby can open and close his or her eyes, and weighs around 4 pounds. If born prematurely (too early), your baby would likely survive with special care.   Month 8  Your baby is building up body fat, and weighs around 6 pounds.    Month 9  Your baby weighs nearly 7 pounds and is about 18 to 20 inches long. In other words, any day now...   Date Last Reviewed: 8/16/2015  © 2202-9085 [employer]. [address] [place] [address], [place] [address]. All rights reserved. This information is not intended as a substitute for professional medical care. Always follow your healthcare professional's instructions.

## 2020-01-03 LAB
BACTERIAL VAGINOSIS DNA: NEGATIVE
C TRACH DNA SPEC QL NAA+PROBE: NOT DETECTED
CANDIDA GLABRATA DNA: NEGATIVE
CANDIDA KRUSEI DNA: NEGATIVE
CANDIDA RRNA VAG QL PROBE: NEGATIVE
N GONORRHOEA DNA SPEC QL NAA+PROBE: NOT DETECTED
T VAGINALIS RRNA GENITAL QL PROBE: NEGATIVE

## 2020-01-04 LAB — BACTERIA UR CULT: NORMAL

## 2020-01-23 ENCOUNTER — ROUTINE PRENATAL (OUTPATIENT)
Dept: OBSTETRICS AND GYNECOLOGY | Facility: CLINIC | Age: 20
End: 2020-01-23
Payer: MEDICAID

## 2020-01-23 VITALS — BODY MASS INDEX: 27.4 KG/M2 | WEIGHT: 140.31 LBS | DIASTOLIC BLOOD PRESSURE: 65 MMHG | SYSTOLIC BLOOD PRESSURE: 117 MMHG

## 2020-01-23 DIAGNOSIS — Z34.00 SUPERVISION OF NORMAL FIRST PREGNANCY, ANTEPARTUM: Primary | ICD-10-CM

## 2020-01-23 DIAGNOSIS — O99.343 DEPRESSION AFFECTING PREGNANCY IN THIRD TRIMESTER, ANTEPARTUM: ICD-10-CM

## 2020-01-23 DIAGNOSIS — F32.A DEPRESSION AFFECTING PREGNANCY IN THIRD TRIMESTER, ANTEPARTUM: ICD-10-CM

## 2020-01-23 PROCEDURE — 99999 PR PBB SHADOW E&M-EST. PATIENT-LVL II: ICD-10-PCS | Mod: PBBFAC,,, | Performed by: OBSTETRICS & GYNECOLOGY

## 2020-01-23 PROCEDURE — 99215 PR OFFICE/OUTPT VISIT, EST, LEVL V, 40-54 MIN: ICD-10-PCS | Mod: TH,S$PBB,, | Performed by: OBSTETRICS & GYNECOLOGY

## 2020-01-23 PROCEDURE — 99215 OFFICE O/P EST HI 40 MIN: CPT | Mod: TH,S$PBB,, | Performed by: OBSTETRICS & GYNECOLOGY

## 2020-01-23 PROCEDURE — 99999 PR PBB SHADOW E&M-EST. PATIENT-LVL II: CPT | Mod: PBBFAC,,, | Performed by: OBSTETRICS & GYNECOLOGY

## 2020-01-23 PROCEDURE — 99212 OFFICE O/P EST SF 10 MIN: CPT | Mod: PBBFAC,TH | Performed by: OBSTETRICS & GYNECOLOGY

## 2020-01-23 RX ORDER — BUPROPION HYDROCHLORIDE 150 MG/1
TABLET ORAL
Qty: 60 TABLET | Refills: 1 | Status: ON HOLD | OUTPATIENT
Start: 2020-01-23 | End: 2020-03-03 | Stop reason: HOSPADM

## 2020-01-23 NOTE — PATIENT INSTRUCTIONS
Labor and Childbirth: Thinking About a Birth Plan  A birth plan outlines your wishes for labor and birth. It helps your healthcare providers know what you want and expect. But be aware that labor is a series of changing conditions and your birth plan may need to change at the last minute. Work with your healthcare provider to create a plan that leaves room for the unexpected.  Your support team    The team that helps you plan your childbirth may include:  · Healthcare provider. He or she gives prenatal care (care during your pregnancy) and delivers your baby.  · Certified nurse-midwife. This is a registered nurse or other health care professional trained to care for pregnant women and deliver babies.  · Labor nurse. This is a nurse who assists during labor and birth.  · Anesthesiologist. This healthcare provider can provide medicine for pain control if you need it.  · Support person. This is a person who helps with your emotional and physical comfort during labor. It might be your partner, a family member, or a friend.  · Labor  or . This person provides nonmedical advice and support.  Questions to think about  Birth preparation classes can help you think about what to include in your birth plan. When making your plan, ask yourself:  · What type of room will I give birth in?  · Do I want to be able to walk around during labor and choose labor positions?  · What types of comfort measures do I want? Massage, acupressure, birth balls, or music?  · Who do I want for my support people? What will their roles be? Who will be with me in the delivery room?  · What are my choices for managing pain during labor and birth? How will medicines for pain affect my baby and my labor?  · Do I want continuous fetal monitoring?     · What types of medicines and IV fluids will I allow to assist me with labor?  · What types of procedures or medicines, (if any) will I allow to speed up the labor process?     · What type of  care and length of hospital stay will my health plan cover?  · What choices would I consider should unexpected circumstances develop?  · If I had a  in the past, is  a choice?  · Do I want immediate contact with my baby after birth with no separation?  · How do I want to feed my baby?  Breastfeeding only, or will I allow some formula?  · Do I want to delay any medicines or immunizations right after my baby is born?  Date Last Reviewed: 2015  © 4167-7690 [employer]. [address] [place] [address], [place] [address]. All rights reserved. This information is not intended as a substitute for professional medical care. Always follow your healthcare professional's instructions.

## 2020-01-23 NOTE — PROGRESS NOTES
Complaints today: Ms. Islas reports that she is doing well. She reports that her back pain has improved but she does have anal pain but is not sure if she has a hemorrhoid. She reports that the zoloft has improved her depression but it keeps her awake all night. She would like to continue with medication but would like to switch to something different.     /65   Wt 63.6 kg (140 lb 5.2 oz)   LMP 2019   BMI 27.40 kg/m²     19 y.o., at 34w3d by Estimated Date of Delivery: 3/2/20  Patient Active Problem List   Diagnosis    Supervision of normal first pregnancy, antepartum    Nausea & vomiting    Anemia during pregnancy in third trimester     OB History    Para Term  AB Living   1 0 0 0 0 0   SAB TAB Ectopic Multiple Live Births   0 0 0 0 0      # Outcome Date GA Lbr Chad/2nd Weight Sex Delivery Anes PTL Lv   1 Current                Dating reviewed    Allergies and problem list reviewed and updated    Medical and surgical history reviewed    Prenatal labs reviewed and updated    Physical Exam:  ABD: soft, gravid, nontender, 34cm    Assessment:  Earnest was seen today for routine prenatal visit.    Diagnoses and all orders for this visit:    Supervision of normal first pregnancy, antepartum  - Doing well  - Back pain improved  - POssible Hemorrhoids     Depression affecting pregnancy in third trimester, antepartum  -     buPROPion (WELLBUTRIN XL) 150 MG TB24 tablet; Take 1 tablet for one week then increase to 2 tablets.  - Zoloft helping but keeping her awake at night          No orders of the defined types were placed in this encounter.      Follow up in about 1 week (around 2020) for routine OB.

## 2020-02-06 ENCOUNTER — ROUTINE PRENATAL (OUTPATIENT)
Dept: OBSTETRICS AND GYNECOLOGY | Facility: CLINIC | Age: 20
End: 2020-02-06
Payer: MEDICAID

## 2020-02-06 ENCOUNTER — LAB VISIT (OUTPATIENT)
Dept: LAB | Facility: HOSPITAL | Age: 20
End: 2020-02-06
Attending: OBSTETRICS & GYNECOLOGY
Payer: MEDICAID

## 2020-02-06 VITALS
SYSTOLIC BLOOD PRESSURE: 128 MMHG | WEIGHT: 143.31 LBS | DIASTOLIC BLOOD PRESSURE: 67 MMHG | BODY MASS INDEX: 27.99 KG/M2

## 2020-02-06 DIAGNOSIS — Z34.00 SUPERVISION OF NORMAL FIRST PREGNANCY, ANTEPARTUM: ICD-10-CM

## 2020-02-06 DIAGNOSIS — Z34.00 SUPERVISION OF NORMAL FIRST PREGNANCY, ANTEPARTUM: Primary | ICD-10-CM

## 2020-02-06 LAB
BASOPHILS # BLD AUTO: 0.03 K/UL (ref 0–0.2)
BASOPHILS NFR BLD: 0.4 % (ref 0–1.9)
DIFFERENTIAL METHOD: ABNORMAL
EOSINOPHIL # BLD AUTO: 0 K/UL (ref 0–0.5)
EOSINOPHIL NFR BLD: 0.6 % (ref 0–8)
ERYTHROCYTE [DISTWIDTH] IN BLOOD BY AUTOMATED COUNT: 12.8 % (ref 11.5–14.5)
HCT VFR BLD AUTO: 33.6 % (ref 37–48.5)
HGB BLD-MCNC: 11.1 G/DL (ref 12–16)
IMM GRANULOCYTES # BLD AUTO: 0.03 K/UL (ref 0–0.04)
IMM GRANULOCYTES NFR BLD AUTO: 0.4 % (ref 0–0.5)
LYMPHOCYTES # BLD AUTO: 2.4 K/UL (ref 1–4.8)
LYMPHOCYTES NFR BLD: 32.7 % (ref 18–48)
MCH RBC QN AUTO: 28.3 PG (ref 27–31)
MCHC RBC AUTO-ENTMCNC: 33 G/DL (ref 32–36)
MCV RBC AUTO: 86 FL (ref 82–98)
MONOCYTES # BLD AUTO: 0.5 K/UL (ref 0.3–1)
MONOCYTES NFR BLD: 6.5 % (ref 4–15)
NEUTROPHILS # BLD AUTO: 4.3 K/UL (ref 1.8–7.7)
NEUTROPHILS NFR BLD: 59.4 % (ref 38–73)
NRBC BLD-RTO: 0 /100 WBC
PLATELET # BLD AUTO: 287 K/UL (ref 150–350)
PMV BLD AUTO: 10.3 FL (ref 9.2–12.9)
RBC # BLD AUTO: 3.92 M/UL (ref 4–5.4)
WBC # BLD AUTO: 7.19 K/UL (ref 3.9–12.7)

## 2020-02-06 PROCEDURE — 99999 PR PBB SHADOW E&M-EST. PATIENT-LVL II: CPT | Mod: PBBFAC,,, | Performed by: OBSTETRICS & GYNECOLOGY

## 2020-02-06 PROCEDURE — 99999 PR PBB SHADOW E&M-EST. PATIENT-LVL II: ICD-10-PCS | Mod: PBBFAC,,, | Performed by: OBSTETRICS & GYNECOLOGY

## 2020-02-06 PROCEDURE — 87081 CULTURE SCREEN ONLY: CPT

## 2020-02-06 PROCEDURE — 86592 SYPHILIS TEST NON-TREP QUAL: CPT

## 2020-02-06 PROCEDURE — 99212 OFFICE O/P EST SF 10 MIN: CPT | Mod: PBBFAC,TH | Performed by: OBSTETRICS & GYNECOLOGY

## 2020-02-06 PROCEDURE — 99215 PR OFFICE/OUTPT VISIT, EST, LEVL V, 40-54 MIN: ICD-10-PCS | Mod: TH,S$PBB,, | Performed by: OBSTETRICS & GYNECOLOGY

## 2020-02-06 PROCEDURE — 99215 OFFICE O/P EST HI 40 MIN: CPT | Mod: TH,S$PBB,, | Performed by: OBSTETRICS & GYNECOLOGY

## 2020-02-06 PROCEDURE — 85025 COMPLETE CBC W/AUTO DIFF WBC: CPT

## 2020-02-06 PROCEDURE — 86703 HIV-1/HIV-2 1 RESULT ANTBDY: CPT

## 2020-02-06 PROCEDURE — 36415 COLL VENOUS BLD VENIPUNCTURE: CPT

## 2020-02-06 NOTE — PROGRESS NOTES
Complaints today: Ms. Islas reports that she is doing well with no complaints. Normal fetal movements with no vaginal bleeding, LOF or contractions at this time.     /67   Wt 65 kg (143 lb 4.8 oz)   LMP 2019   BMI 27.99 kg/m²     19 y.o., at 36w3d by Estimated Date of Delivery: 3/2/20  Patient Active Problem List   Diagnosis    Supervision of normal first pregnancy, antepartum    Nausea & vomiting    Anemia during pregnancy in third trimester     OB History    Para Term  AB Living   1 0 0 0 0 0   SAB TAB Ectopic Multiple Live Births   0 0 0 0 0      # Outcome Date GA Lbr Chad/2nd Weight Sex Delivery Anes PTL Lv   1 Current                Dating reviewed    Allergies and problem list reviewed and updated    Medical and surgical history reviewed    Prenatal labs reviewed and updated    Physical Exam:  ABD: soft, gravid, nontender, 36cm    Assessment:  Earnest was seen today for routine prenatal visit.    Diagnoses and all orders for this visit:    Supervision of normal first pregnancy, antepartum  -     HIV 1/2 Ag/Ab (4th Gen); Future  -     Strep B Screen, Vaginal / Rectal  -     RPR; Future  -     CBC auto differential; Future  - Bleeding/pain, kick counts, labor precautions labor         Orders Placed This Encounter   Procedures    Strep B Screen, Vaginal / Rectal    HIV 1/2 Ag/Ab (4th Gen)    RPR    CBC auto differential       Follow up in about 1 week (around 2020) for routine OB.

## 2020-02-06 NOTE — PATIENT INSTRUCTIONS
Labor and Childbirth: Thinking About a Birth Plan  A birth plan outlines your wishes for labor and birth. It helps your healthcare providers know what you want and expect. But be aware that labor is a series of changing conditions and your birth plan may need to change at the last minute. Work with your healthcare provider to create a plan that leaves room for the unexpected.  Your support team    The team that helps you plan your childbirth may include:  · Healthcare provider. He or she gives prenatal care (care during your pregnancy) and delivers your baby.  · Certified nurse-midwife. This is a registered nurse or other health care professional trained to care for pregnant women and deliver babies.  · Labor nurse. This is a nurse who assists during labor and birth.  · Anesthesiologist. This healthcare provider can provide medicine for pain control if you need it.  · Support person. This is a person who helps with your emotional and physical comfort during labor. It might be your partner, a family member, or a friend.  · Labor  or . This person provides nonmedical advice and support.  Questions to think about  Birth preparation classes can help you think about what to include in your birth plan. When making your plan, ask yourself:  · What type of room will I give birth in?  · Do I want to be able to walk around during labor and choose labor positions?  · What types of comfort measures do I want? Massage, acupressure, birth balls, or music?  · Who do I want for my support people? What will their roles be? Who will be with me in the delivery room?  · What are my choices for managing pain during labor and birth? How will medicines for pain affect my baby and my labor?  · Do I want continuous fetal monitoring?     · What types of medicines and IV fluids will I allow to assist me with labor?  · What types of procedures or medicines, (if any) will I allow to speed up the labor process?     · What type of  care and length of hospital stay will my health plan cover?  · What choices would I consider should unexpected circumstances develop?  · If I had a  in the past, is  a choice?  · Do I want immediate contact with my baby after birth with no separation?  · How do I want to feed my baby?  Breastfeeding only, or will I allow some formula?  · Do I want to delay any medicines or immunizations right after my baby is born?  Date Last Reviewed: 2015  © 2389-9686 [employer]. [address] [place] [address], [place] [address]. All rights reserved. This information is not intended as a substitute for professional medical care. Always follow your healthcare professional's instructions.

## 2020-02-07 LAB
HIV 1+2 AB+HIV1 P24 AG SERPL QL IA: NEGATIVE
RPR SER QL: NORMAL

## 2020-02-08 LAB — BACTERIA SPEC AEROBE CULT: NORMAL

## 2020-02-20 ENCOUNTER — ROUTINE PRENATAL (OUTPATIENT)
Dept: OBSTETRICS AND GYNECOLOGY | Facility: CLINIC | Age: 20
End: 2020-02-20
Payer: MEDICAID

## 2020-02-20 VITALS
BODY MASS INDEX: 29.36 KG/M2 | DIASTOLIC BLOOD PRESSURE: 76 MMHG | WEIGHT: 150.38 LBS | SYSTOLIC BLOOD PRESSURE: 135 MMHG

## 2020-02-20 DIAGNOSIS — O26.893 VAGINAL DISCHARGE DURING PREGNANCY IN THIRD TRIMESTER: ICD-10-CM

## 2020-02-20 DIAGNOSIS — Z34.00 SUPERVISION OF NORMAL FIRST PREGNANCY, ANTEPARTUM: Primary | ICD-10-CM

## 2020-02-20 DIAGNOSIS — N89.8 VAGINAL DISCHARGE DURING PREGNANCY IN THIRD TRIMESTER: ICD-10-CM

## 2020-02-20 PROCEDURE — 99215 OFFICE O/P EST HI 40 MIN: CPT | Mod: TH,S$PBB,, | Performed by: OBSTETRICS & GYNECOLOGY

## 2020-02-20 PROCEDURE — 87481 CANDIDA DNA AMP PROBE: CPT | Mod: 59

## 2020-02-20 PROCEDURE — 99212 OFFICE O/P EST SF 10 MIN: CPT | Mod: PBBFAC,TH | Performed by: OBSTETRICS & GYNECOLOGY

## 2020-02-20 PROCEDURE — 99999 PR PBB SHADOW E&M-EST. PATIENT-LVL II: CPT | Mod: PBBFAC,,, | Performed by: OBSTETRICS & GYNECOLOGY

## 2020-02-20 PROCEDURE — 99999 PR PBB SHADOW E&M-EST. PATIENT-LVL II: ICD-10-PCS | Mod: PBBFAC,,, | Performed by: OBSTETRICS & GYNECOLOGY

## 2020-02-20 PROCEDURE — 99215 PR OFFICE/OUTPT VISIT, EST, LEVL V, 40-54 MIN: ICD-10-PCS | Mod: TH,S$PBB,, | Performed by: OBSTETRICS & GYNECOLOGY

## 2020-02-20 PROCEDURE — 87661 TRICHOMONAS VAGINALIS AMPLIF: CPT

## 2020-02-20 PROCEDURE — 87491 CHLMYD TRACH DNA AMP PROBE: CPT | Mod: 59

## 2020-02-20 NOTE — PATIENT INSTRUCTIONS
Labor and Childbirth: Your Body Prepares  Labor is the series of uterine contractions that dilate (open) and efface (thin) your cervix for birth. Your due date is a guide to when labor will begin, but babies often come days or weeks before or after due dates. Even so, labor need not take you by surprise. In the last weeks of pregnancy, you or your healthcare provider may notice changes that mean labor is near.     Changes in your body  Physical changes often signal that your baby will soon be born:  · Discharge from your vagina may increase and become thicker. You may notice a pink or brownish discharge called the bloody show.  · The mucous plug may break down over a few weeks or all at once. Losing the plug doesnt mean that labor will start right away.  · You may feel Jackson Doyle contractions (false labor). These irregular contractions start to soften and thin the cervix. Many women mistake these contractions for true labor. They may be more noticeable towards the end of the day.  · Feeling like the baby has dropped lower. In preparation for birth, the baby's head has settled deep into your pelvis.   Date Last Reviewed: 8/16/2015  © 5988-1457 [employer]. [address] [place] [address], [place] [address]. All rights reserved. This information is not intended as a substitute for professional medical care. Always follow your healthcare professional's instructions.

## 2020-02-20 NOTE — PROGRESS NOTES
Complaints today: Ms. Islas reports that he is doing well with no complaints. Normal fetal movements with no vaginal bleeding, LOF or contractions at this time.     /76   Wt 68.2 kg (150 lb 5.7 oz)   LMP 2019   BMI 29.36 kg/m²     19 y.o., at 38w3d by Estimated Date of Delivery: 3/2/20  Patient Active Problem List   Diagnosis    Supervision of normal first pregnancy, antepartum    Nausea & vomiting    Anemia during pregnancy in third trimester     OB History    Para Term  AB Living   1 0 0 0 0 0   SAB TAB Ectopic Multiple Live Births   0 0 0 0 0      # Outcome Date GA Lbr Chad/2nd Weight Sex Delivery Anes PTL Lv   1 Current                Dating reviewed    Allergies and problem list reviewed and updated    Medical and surgical history reviewed    Prenatal labs reviewed and updated    Physical Exam:  ABD: soft, gravid, nontender, 38cm  : Small white vaginal d/c    Assessment:  Earnest was seen today for routine prenatal visit.    Diagnoses and all orders for this visit:    Supervision of normal first pregnancy, antepartum  -  bleeding/pain, kick counts, labor precautions given     Vaginal discharge during pregnancy in third trimester  -     C. trachomatis/N. gonorrhoeae by AMP DNA  -     Vaginosis Screen by DNA Probe        Orders Placed This Encounter   Procedures    C. trachomatis/N. gonorrhoeae by AMP DNA    Vaginosis Screen by DNA Probe       Follow up in about 1 week (around 2020) for routine OB.

## 2020-02-22 LAB
C TRACH DNA SPEC QL NAA+PROBE: NOT DETECTED
N GONORRHOEA DNA SPEC QL NAA+PROBE: NOT DETECTED

## 2020-02-26 ENCOUNTER — HOSPITAL ENCOUNTER (OUTPATIENT)
Facility: HOSPITAL | Age: 20
Discharge: HOME | End: 2020-02-27
Attending: EMERGENCY MEDICINE | Admitting: OBSTETRICS & GYNECOLOGY
Payer: MEDICAID

## 2020-02-26 DIAGNOSIS — Z3A.39 39 WEEKS GESTATION OF PREGNANCY: ICD-10-CM
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
## 2020-02-26 DIAGNOSIS — S39.012A BACK STRAIN, INITIAL ENCOUNTER: ICD-10-CM

## 2020-02-26 DIAGNOSIS — R10.9 ABDOMINAL PAIN AFFECTING PREGNANCY: ICD-10-CM

## 2020-02-26 DIAGNOSIS — O26.899 ABDOMINAL PAIN AFFECTING PREGNANCY: ICD-10-CM

## 2020-02-26 PROCEDURE — 59025 FETAL NON-STRESS TEST: CPT

## 2020-02-26 PROCEDURE — 99999 HC NO LEVEL OF SERVICE - ED ONLY: CPT

## 2020-02-26 PROCEDURE — 25000003 PHARM REV CODE 250: Performed by: OBSTETRICS & GYNECOLOGY

## 2020-02-26 PROCEDURE — S5010 5% DEXTROSE AND 0.45% SALINE: HCPCS | Performed by: OBSTETRICS & GYNECOLOGY

## 2020-02-26 PROCEDURE — 96360 HYDRATION IV INFUSION INIT: CPT

## 2020-02-26 PROCEDURE — 96361 HYDRATE IV INFUSION ADD-ON: CPT | Mod: 59

## 2020-02-26 RX ORDER — DEXTROSE MONOHYDRATE AND SODIUM CHLORIDE 5; .45 G/100ML; G/100ML
INJECTION, SOLUTION INTRAVENOUS CONTINUOUS
Status: DISCONTINUED | OUTPATIENT
Start: 2020-02-26 | End: 2020-02-27 | Stop reason: HOSPADM

## 2020-02-26 RX ORDER — ACETAMINOPHEN 500 MG
1000 TABLET ORAL EVERY 6 HOURS PRN
Status: DISCONTINUED | OUTPATIENT
Start: 2020-02-26 | End: 2020-02-27 | Stop reason: HOSPADM

## 2020-02-26 RX ADMIN — DEXTROSE AND SODIUM CHLORIDE: 5; .45 INJECTION, SOLUTION INTRAVENOUS at 09:02

## 2020-02-27 ENCOUNTER — ROUTINE PRENATAL (OUTPATIENT)
Dept: OBSTETRICS AND GYNECOLOGY | Facility: CLINIC | Age: 20
End: 2020-02-27
Payer: MEDICAID

## 2020-02-27 VITALS
TEMPERATURE: 98 F | HEIGHT: 60 IN | BODY MASS INDEX: 30.01 KG/M2 | WEIGHT: 153.69 LBS | DIASTOLIC BLOOD PRESSURE: 88 MMHG | HEART RATE: 66 BPM | SYSTOLIC BLOOD PRESSURE: 123 MMHG | DIASTOLIC BLOOD PRESSURE: 77 MMHG | BODY MASS INDEX: 28.47 KG/M2 | WEIGHT: 145 LBS | SYSTOLIC BLOOD PRESSURE: 132 MMHG | OXYGEN SATURATION: 100 % | RESPIRATION RATE: 18 BRPM

## 2020-02-27 DIAGNOSIS — Z34.90 ENCOUNTER FOR INDUCTION OF LABOR: ICD-10-CM

## 2020-02-27 DIAGNOSIS — Z34.00 SUPERVISION OF NORMAL FIRST PREGNANCY, ANTEPARTUM: Primary | ICD-10-CM

## 2020-02-27 LAB
BILIRUB UR QL STRIP: NEGATIVE
CLARITY UR: ABNORMAL
COLOR UR: ABNORMAL
GLUCOSE UR QL STRIP: NEGATIVE
HGB UR QL STRIP: NEGATIVE
KETONES UR QL STRIP: NEGATIVE
LEUKOCYTE ESTERASE UR QL STRIP: NEGATIVE
NITRITE UR QL STRIP: NEGATIVE
PH UR STRIP: 6 [PH] (ref 5–8)
PROT UR QL STRIP: NEGATIVE
SP GR UR STRIP: 1.01 (ref 1–1.03)
URN SPEC COLLECT METH UR: ABNORMAL
UROBILINOGEN UR STRIP-ACNC: NEGATIVE EU/DL

## 2020-02-27 PROCEDURE — 99999 PR PBB SHADOW E&M-EST. PATIENT-LVL II: CPT | Mod: PBBFAC,,, | Performed by: OBSTETRICS & GYNECOLOGY

## 2020-02-27 PROCEDURE — 99212 OFFICE O/P EST SF 10 MIN: CPT | Mod: PBBFAC,25,TH | Performed by: OBSTETRICS & GYNECOLOGY

## 2020-02-27 PROCEDURE — 99215 PR OFFICE/OUTPT VISIT, EST, LEVL V, 40-54 MIN: ICD-10-PCS | Mod: TH,S$PBB,, | Performed by: OBSTETRICS & GYNECOLOGY

## 2020-02-27 PROCEDURE — 25000003 PHARM REV CODE 250: Performed by: OBSTETRICS & GYNECOLOGY

## 2020-02-27 PROCEDURE — 99999 PR PBB SHADOW E&M-EST. PATIENT-LVL II: ICD-10-PCS | Mod: PBBFAC,,, | Performed by: OBSTETRICS & GYNECOLOGY

## 2020-02-27 PROCEDURE — 96361 HYDRATE IV INFUSION ADD-ON: CPT

## 2020-02-27 PROCEDURE — 81003 URINALYSIS AUTO W/O SCOPE: CPT

## 2020-02-27 PROCEDURE — 99215 OFFICE O/P EST HI 40 MIN: CPT | Mod: TH,S$PBB,, | Performed by: OBSTETRICS & GYNECOLOGY

## 2020-02-27 RX ORDER — TERBUTALINE SULFATE 1 MG/ML
0.25 INJECTION SUBCUTANEOUS
Status: CANCELLED | OUTPATIENT
Start: 2020-02-27

## 2020-02-27 RX ORDER — MISOPROSTOL 100 MCG
50 TABLET ORAL EVERY 4 HOURS
Status: CANCELLED | OUTPATIENT
Start: 2020-02-27 | End: 2020-02-28

## 2020-02-27 RX ORDER — ONDANSETRON 4 MG/1
8 TABLET, ORALLY DISINTEGRATING ORAL EVERY 8 HOURS PRN
Status: CANCELLED | OUTPATIENT
Start: 2020-02-27

## 2020-02-27 RX ORDER — OXYTOCIN/RINGER'S LACTATE 30/500 ML
95 PLASTIC BAG, INJECTION (ML) INTRAVENOUS ONCE
Status: CANCELLED | OUTPATIENT
Start: 2020-02-27 | End: 2020-02-27

## 2020-02-27 RX ORDER — SIMETHICONE 80 MG
1 TABLET,CHEWABLE ORAL 4 TIMES DAILY PRN
Status: CANCELLED | OUTPATIENT
Start: 2020-02-27

## 2020-02-27 RX ORDER — CALCIUM CARBONATE 200(500)MG
500 TABLET,CHEWABLE ORAL 3 TIMES DAILY PRN
Status: CANCELLED | OUTPATIENT
Start: 2020-02-27

## 2020-02-27 RX ORDER — MISOPROSTOL 100 UG/1
800 TABLET ORAL
Status: CANCELLED | OUTPATIENT
Start: 2020-02-27

## 2020-02-27 RX ORDER — OXYTOCIN/RINGER'S LACTATE 30/500 ML
2 PLASTIC BAG, INJECTION (ML) INTRAVENOUS CONTINUOUS
Status: CANCELLED | OUTPATIENT
Start: 2020-02-27

## 2020-02-27 RX ORDER — SODIUM CHLORIDE, SODIUM LACTATE, POTASSIUM CHLORIDE, CALCIUM CHLORIDE 600; 310; 30; 20 MG/100ML; MG/100ML; MG/100ML; MG/100ML
INJECTION, SOLUTION INTRAVENOUS CONTINUOUS
Status: CANCELLED | OUTPATIENT
Start: 2020-02-27

## 2020-02-27 RX ORDER — BUTORPHANOL TARTRATE 1 MG/ML
1 INJECTION INTRAMUSCULAR; INTRAVENOUS
Status: CANCELLED | OUTPATIENT
Start: 2020-02-27

## 2020-02-27 RX ORDER — SODIUM CHLORIDE 9 MG/ML
INJECTION, SOLUTION INTRAVENOUS
Status: CANCELLED | OUTPATIENT
Start: 2020-02-27

## 2020-02-27 RX ORDER — SODIUM CHLORIDE 9 MG/ML
INJECTION, SOLUTION INTRAVENOUS CONTINUOUS
Status: CANCELLED | OUTPATIENT
Start: 2020-02-27

## 2020-02-27 RX ORDER — METHYLERGONOVINE MALEATE 0.2 MG/ML
200 INJECTION INTRAVENOUS
Status: CANCELLED | OUTPATIENT
Start: 2020-02-27

## 2020-02-27 RX ORDER — DIPHENOXYLATE HYDROCHLORIDE AND ATROPINE SULFATE 2.5; .025 MG/1; MG/1
1 TABLET ORAL 4 TIMES DAILY PRN
Status: CANCELLED | OUTPATIENT
Start: 2020-02-27

## 2020-02-27 RX ORDER — CARBOPROST TROMETHAMINE 250 UG/ML
250 INJECTION, SOLUTION INTRAMUSCULAR
Status: CANCELLED | OUTPATIENT
Start: 2020-02-27

## 2020-02-27 RX ORDER — OXYTOCIN/RINGER'S LACTATE 30/500 ML
334 PLASTIC BAG, INJECTION (ML) INTRAVENOUS ONCE
Status: CANCELLED | OUTPATIENT
Start: 2020-02-27 | End: 2020-02-27

## 2020-02-27 RX ADMIN — ACETAMINOPHEN 1000 MG: 500 TABLET ORAL at 12:02
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
## 2020-02-27 NOTE — DISCHARGE INSTRUCTIONS
Home Undelivered Discharge Instructions    After Discharge Orders:    Future Appointments   Date Time Provider Department Center   2/27/2020  1:45 PM Aletha Pang MD [facility]         Current Discharge Medication List      CONTINUE these medications which have NOT CHANGED    Details   buPROPion (WELLBUTRIN XL) 150 MG TB24 tablet Take 1 tablet for one week then increase to 2 tablets.  Qty: 60 tablet, Refills: 1    Associated Diagnoses: Depression affecting pregnancy in third trimester, antepartum      ferrous sulfate 325 (65 FE) MG EC tablet Take 1 tablet (325 mg total) by mouth once daily.  Qty: 90 tablet, Refills: 1    Associated Diagnoses: Anemia during pregnancy in third trimester      metoclopramide HCl (REGLAN) 5 MG tablet Take 1 tablet (5 mg total) by mouth 4 (four) times daily as needed (nausea).  Qty: 40 tablet, Refills: 0    Associated Diagnoses: Nausea and vomiting in pregnancy      ondansetron (ZOFRAN-ODT) 4 MG TbDL Take 2 tablets (8 mg total) by mouth every 8 (eight) hours.  Qty: 20 tablet, Refills: 0    Associated Diagnoses: Nausea and vomiting in pregnancy      prenatal 26-iron ps-folic-dha (VITAFOL-ONE) 29 mg iron- 1 mg-200 mg Cap Take 1 capsule by mouth once daily.  Qty: 90 capsule, Refills: 3    Associated Diagnoses: Pregnancy confirmed by positive urine test      sertraline (ZOLOFT) 50 MG tablet Take 1 tablet (50 mg total) by mouth once daily.  Qty: 30 tablet, Refills: 11    Associated Diagnoses: Depression affecting pregnancy in third trimester, antepartum                     · Diet:  normal diet as tolerated    · Rest: normal activity as tolerated    Other instructions: Do kick counts once a day on your baby. Choose the time of day your baby is most active. Get in a comfortable lying or sitting position and time how long it takes to feel 10 kicks, twists, turns, swishes, or rolls. Call your physician or midwife if there have not been 10 kicks in 1 hours    Call physician  or midwife, return to Labor and Delivery, call 911, or go to the nearest Emergency Room if: increased leakage or fluid, decreased fetal movement, persistent low back pain or cramping, bleeding from vaginal area, persistent headache or vision change

## 2020-02-27 NOTE — PATIENT INSTRUCTIONS
Labor Induction  Labor induction is a way to help get your labor started. This can protect your health and your babys, too.  Ways to induce labor  Your healthcare provider can get your labor started by using any of 3 methods or a combination of them. Here are some common treatments:  Prostaglandin. A medicine that may be given as a pill, capsule, or vaginal suppository. It softens, thins, and opens the cervix. This is called cervical ripening. Your healthcare provider may also use a madera catheter or a double balloon catheter. Your healthcare provider inserts the catheter into your cervix to mechanically dilate it and cause the release of prostaglandins.  Pitocin (oxytocin). A medicine your healthcare provider gives you through an IV (intravenous) line. You may get it within 4 to 24 hours after your healthcare provider gives you prostaglandin. Pitocin helps start contractions. Its always given in the hospital.  Rupturing the membrane. A procedure in which your healthcare provider uses a small tool to break your bag of water. Healthcare providers perform this procedure more often in women who have given birth before. And its always done in the hospital.  Reasons for inducing labor  There are reasons that your healthcare provider will decide to induce labor, including the following:     · When the health of the mother or fetus is at risk with continuing the pregnancy, like preeclampsia, poor fetal growth, low amniotic fluid, infection of the membranes (chorioamnionitis), ruptured bag of water, and certain diseases, like diabetes  · Elective after 39 weeks for nonmedical reasons like living far from the hospital   What to expect  Prostaglandin may be given in the hospital. Fetal monitoring is required after placement. Other methods of inducing labor are done in the hospital. Youll need to stay there until you give birth. Your healthcare provider may attach monitors to your belly to measure contractions and help  make sure your baby has no problems. No matter how your healthcare provider induces labor, a few factors may affect how long it takes you to give birth. These include how long it takes for your cervix to thin and open, and when contractions begin.  Give yourself time  Even though inducing labor gets the process started, you still may need to wait. Mothers who have labor induced most often give birth within a day or so. But it can take as long as a few days to give birth.  With labor induction, you may have a greater chance of:  · A  section (surgical delivery)  · An infection  · A longer hospital stay  · Uterine rupture although rare  · Fetal death although extremely rare   Date Last Reviewed: 2015  © 1008-5166 [employer]. [address] [place] [address], [place] [address]. All rights reserved. This information is not intended as a substitute for professional medical care. Always follow your healthcare professional's instructions.

## 2020-03-01 ENCOUNTER — ANESTHESIA EVENT (OUTPATIENT)
Dept: OBSTETRICS AND GYNECOLOGY | Facility: HOSPITAL | Age: 20
End: 2020-03-01
Payer: MEDICAID

## 2020-03-01 ENCOUNTER — ANESTHESIA (OUTPATIENT)
Dept: OBSTETRICS AND GYNECOLOGY | Facility: HOSPITAL | Age: 20
End: 2020-03-01
Payer: MEDICAID

## 2020-03-01 ENCOUNTER — HOSPITAL ENCOUNTER (INPATIENT)
Facility: HOSPITAL | Age: 20
LOS: 2 days | Discharge: HOME | End: 2020-03-03
Attending: OBSTETRICS & GYNECOLOGY | Admitting: OBSTETRICS & GYNECOLOGY
Payer: MEDICAID

## 2020-03-01 DIAGNOSIS — Z34.90 ENCOUNTER FOR INDUCTION OF LABOR: ICD-10-CM

## 2020-03-01 DIAGNOSIS — O47.9 UTERINE CONTRACTIONS DURING PREGNANCY: ICD-10-CM

## 2020-03-01 LAB
ABO + RH BLD: NORMAL
BASOPHILS # BLD AUTO: 0.02 K/UL (ref 0–0.2)
BASOPHILS NFR BLD: 0.3 % (ref 0–1.9)
BLD GP AB SCN CELLS X3 SERPL QL: NORMAL
DIFFERENTIAL METHOD: ABNORMAL
EOSINOPHIL # BLD AUTO: 0.1 K/UL (ref 0–0.5)
EOSINOPHIL NFR BLD: 0.8 % (ref 0–8)
ERYTHROCYTE [DISTWIDTH] IN BLOOD BY AUTOMATED COUNT: 13.1 % (ref 11.5–14.5)
HCT VFR BLD AUTO: 31.9 % (ref 37–48.5)
HGB BLD-MCNC: 10.5 G/DL (ref 12–16)
IMM GRANULOCYTES # BLD AUTO: 0.03 K/UL (ref 0–0.04)
IMM GRANULOCYTES NFR BLD AUTO: 0.4 % (ref 0–0.5)
LYMPHOCYTES # BLD AUTO: 2.1 K/UL (ref 1–4.8)
LYMPHOCYTES NFR BLD: 28.1 % (ref 18–48)
MCH RBC QN AUTO: 28.3 PG (ref 27–31)
MCHC RBC AUTO-ENTMCNC: 32.9 G/DL (ref 32–36)
MCV RBC AUTO: 86 FL (ref 82–98)
MONOCYTES # BLD AUTO: 0.4 K/UL (ref 0.3–1)
MONOCYTES NFR BLD: 5.3 % (ref 4–15)
NEUTROPHILS # BLD AUTO: 4.8 K/UL (ref 1.8–7.7)
NEUTROPHILS NFR BLD: 65.1 % (ref 38–73)
NRBC BLD-RTO: 0 /100 WBC
PLATELET # BLD AUTO: 246 K/UL (ref 150–350)
PMV BLD AUTO: 11.2 FL (ref 9.2–12.9)
RBC # BLD AUTO: 3.71 M/UL (ref 4–5.4)
WBC # BLD AUTO: 7.33 K/UL (ref 3.9–12.7)

## 2020-03-01 PROCEDURE — 27200710 HC EPIDURAL INFUSION PUMP SET: Performed by: ANESTHESIOLOGY

## 2020-03-01 PROCEDURE — 85025 COMPLETE CBC W/AUTO DIFF WBC: CPT

## 2020-03-01 PROCEDURE — 62326 NJX INTERLAMINAR LMBR/SAC: CPT | Performed by: ANESTHESIOLOGY

## 2020-03-01 PROCEDURE — 59409 OBSTETRICAL CARE: CPT | Mod: GB,,, | Performed by: OBSTETRICS & GYNECOLOGY

## 2020-03-01 PROCEDURE — C1751 CATH, INF, PER/CENT/MIDLINE: HCPCS | Performed by: ANESTHESIOLOGY

## 2020-03-01 PROCEDURE — 59409 PR OBSTETRICAL CARE,VAG DELIV ONLY: ICD-10-PCS | Mod: GB,,, | Performed by: OBSTETRICS & GYNECOLOGY

## 2020-03-01 PROCEDURE — 11000001 HC ACUTE MED/SURG PRIVATE ROOM

## 2020-03-01 PROCEDURE — 25000003 PHARM REV CODE 250: Performed by: OBSTETRICS & GYNECOLOGY

## 2020-03-01 PROCEDURE — 59409 PRA ETRICAL CARE,VAG DELIV ONLY: ICD-10-PCS | Mod: AA,,, | Performed by: ANESTHESIOLOGY

## 2020-03-01 PROCEDURE — 25000003 PHARM REV CODE 250

## 2020-03-01 PROCEDURE — 99211 OFF/OP EST MAY X REQ PHY/QHP: CPT | Mod: 25

## 2020-03-01 PROCEDURE — 36415 COLL VENOUS BLD VENIPUNCTURE: CPT

## 2020-03-01 PROCEDURE — 63600175 PHARM REV CODE 636 W HCPCS: Performed by: OBSTETRICS & GYNECOLOGY

## 2020-03-01 PROCEDURE — 86900 BLOOD TYPING SEROLOGIC ABO: CPT

## 2020-03-01 PROCEDURE — 59025 PR FETAL 2N-STRESS TEST: ICD-10-PCS | Mod: 26,,, | Performed by: OBSTETRICS & GYNECOLOGY

## 2020-03-01 PROCEDURE — 59409 OBSTETRICAL CARE: CPT | Mod: AA,,, | Performed by: ANESTHESIOLOGY

## 2020-03-01 PROCEDURE — 72100002 HC LABOR CARE, 1ST 8 HOURS

## 2020-03-01 PROCEDURE — 25000003 PHARM REV CODE 250: Performed by: ANESTHESIOLOGY

## 2020-03-01 PROCEDURE — 86592 SYPHILIS TEST NON-TREP QUAL: CPT

## 2020-03-01 PROCEDURE — 59025 FETAL NON-STRESS TEST: CPT | Mod: 26,,, | Performed by: OBSTETRICS & GYNECOLOGY

## 2020-03-01 PROCEDURE — 72200004 HC VAGINAL DELIVERY LEVEL I

## 2020-03-01 PROCEDURE — 51701 INSERT BLADDER CATHETER: CPT

## 2020-03-01 RX ORDER — SODIUM CHLORIDE 9 MG/ML
INJECTION, SOLUTION INTRAVENOUS CONTINUOUS
Status: DISCONTINUED | OUTPATIENT
Start: 2020-03-01 | End: 2020-03-02

## 2020-03-01 RX ORDER — CARBOPROST TROMETHAMINE 250 UG/ML
250 INJECTION, SOLUTION INTRAMUSCULAR
Status: DISCONTINUED | OUTPATIENT
Start: 2020-03-01 | End: 2020-03-02

## 2020-03-01 RX ORDER — SODIUM CHLORIDE 9 MG/ML
INJECTION, SOLUTION INTRAVENOUS
Status: DISCONTINUED | OUTPATIENT
Start: 2020-03-01 | End: 2020-03-02

## 2020-03-01 RX ORDER — ONDANSETRON 8 MG/1
8 TABLET, ORALLY DISINTEGRATING ORAL EVERY 8 HOURS PRN
Status: DISCONTINUED | OUTPATIENT
Start: 2020-03-01 | End: 2020-03-02

## 2020-03-01 RX ORDER — CALCIUM CARBONATE 200(500)MG
500 TABLET,CHEWABLE ORAL 3 TIMES DAILY PRN
Status: DISCONTINUED | OUTPATIENT
Start: 2020-03-01 | End: 2020-03-02

## 2020-03-01 RX ORDER — LIDOCAINE HYDROCHLORIDE 10 MG/ML
INJECTION INFILTRATION; PERINEURAL
Status: DISCONTINUED
Start: 2020-03-01 | End: 2020-03-01 | Stop reason: WASHOUT

## 2020-03-01 RX ORDER — METHYLERGONOVINE MALEATE 0.2 MG/ML
200 INJECTION INTRAVENOUS
Status: DISCONTINUED | OUTPATIENT
Start: 2020-03-01 | End: 2020-03-02

## 2020-03-01 RX ORDER — OXYTOCIN/RINGER'S LACTATE 30/500 ML
2 PLASTIC BAG, INJECTION (ML) INTRAVENOUS CONTINUOUS
Status: DISCONTINUED | OUTPATIENT
Start: 2020-03-01 | End: 2020-03-02

## 2020-03-01 RX ORDER — TERBUTALINE SULFATE 1 MG/ML
0.25 INJECTION SUBCUTANEOUS
Status: DISCONTINUED | OUTPATIENT
Start: 2020-03-01 | End: 2020-03-02

## 2020-03-01 RX ORDER — SIMETHICONE 80 MG
1 TABLET,CHEWABLE ORAL 4 TIMES DAILY PRN
Status: DISCONTINUED | OUTPATIENT
Start: 2020-03-01 | End: 2020-03-02

## 2020-03-01 RX ORDER — LIDOCAINE HYDROCHLORIDE AND EPINEPHRINE 15; 5 MG/ML; UG/ML
INJECTION, SOLUTION EPIDURAL
Status: DISCONTINUED | OUTPATIENT
Start: 2020-03-01 | End: 2020-03-02

## 2020-03-01 RX ORDER — MISOPROSTOL 200 UG/1
800 TABLET ORAL
Status: DISCONTINUED | OUTPATIENT
Start: 2020-03-01 | End: 2020-03-02

## 2020-03-01 RX ORDER — BUPIVACAINE HYDROCHLORIDE 2.5 MG/ML
INJECTION, SOLUTION EPIDURAL; INFILTRATION; INTRACAUDAL
Status: DISCONTINUED | OUTPATIENT
Start: 2020-03-01 | End: 2020-03-02

## 2020-03-01 RX ORDER — DIPHENOXYLATE HYDROCHLORIDE AND ATROPINE SULFATE 2.5; .025 MG/1; MG/1
1 TABLET ORAL 4 TIMES DAILY PRN
Status: DISCONTINUED | OUTPATIENT
Start: 2020-03-01 | End: 2020-03-02

## 2020-03-01 RX ORDER — MISOPROSTOL 200 UG/1
TABLET ORAL
Status: COMPLETED
Start: 2020-03-01 | End: 2020-03-01

## 2020-03-01 RX ORDER — OXYTOCIN/RINGER'S LACTATE 30/500 ML
95 PLASTIC BAG, INJECTION (ML) INTRAVENOUS ONCE
Status: DISCONTINUED | OUTPATIENT
Start: 2020-03-01 | End: 2020-03-02

## 2020-03-01 RX ORDER — ACETAMINOPHEN 500 MG
500 TABLET ORAL EVERY 6 HOURS PRN
Status: DISCONTINUED | OUTPATIENT
Start: 2020-03-01 | End: 2020-03-02

## 2020-03-01 RX ORDER — BUTORPHANOL TARTRATE 2 MG/ML
1 INJECTION INTRAMUSCULAR; INTRAVENOUS
Status: DISCONTINUED | OUTPATIENT
Start: 2020-03-01 | End: 2020-03-02

## 2020-03-01 RX ORDER — SODIUM CHLORIDE, SODIUM LACTATE, POTASSIUM CHLORIDE, CALCIUM CHLORIDE 600; 310; 30; 20 MG/100ML; MG/100ML; MG/100ML; MG/100ML
INJECTION, SOLUTION INTRAVENOUS CONTINUOUS
Status: DISCONTINUED | OUTPATIENT
Start: 2020-03-01 | End: 2020-03-02

## 2020-03-01 RX ORDER — OXYTOCIN/RINGER'S LACTATE 30/500 ML
334 PLASTIC BAG, INJECTION (ML) INTRAVENOUS ONCE
Status: DISCONTINUED | OUTPATIENT
Start: 2020-03-01 | End: 2020-03-02

## 2020-03-01 RX ORDER — FENTANYL/BUPIVACAINE/NS/PF 2MCG/ML-.1
PLASTIC BAG, INJECTION (ML) INJECTION CONTINUOUS PRN
Status: DISCONTINUED | OUTPATIENT
Start: 2020-03-01 | End: 2020-03-02

## 2020-03-01 RX ORDER — MISOPROSTOL 200 UG/1
200 TABLET ORAL ONCE
Status: COMPLETED | OUTPATIENT
Start: 2020-03-01 | End: 2020-03-01

## 2020-03-01 RX ORDER — MISOPROSTOL 100 MCG
50 TABLET ORAL EVERY 4 HOURS
Status: DISCONTINUED | OUTPATIENT
Start: 2020-03-01 | End: 2020-03-02

## 2020-03-01 RX ADMIN — BUPIVACAINE HYDROCHLORIDE 4 ML: 2.5 INJECTION, SOLUTION EPIDURAL; INFILTRATION; INTRACAUDAL; PERINEURAL at 09:03

## 2020-03-01 RX ADMIN — Medication 95 MILLI-UNITS/MIN: at 11:03

## 2020-03-01 RX ADMIN — MISOPROSTOL 200 MCG: 200 TABLET ORAL at 11:03

## 2020-03-01 RX ADMIN — SODIUM CHLORIDE, SODIUM LACTATE, POTASSIUM CHLORIDE, AND CALCIUM CHLORIDE: .6; .31; .03; .02 INJECTION, SOLUTION INTRAVENOUS at 08:03

## 2020-03-01 RX ADMIN — SODIUM CHLORIDE, SODIUM LACTATE, POTASSIUM CHLORIDE, AND CALCIUM CHLORIDE 1000 ML: .6; .31; .03; .02 INJECTION, SOLUTION INTRAVENOUS at 07:03

## 2020-03-01 RX ADMIN — LIDOCAINE HYDROCHLORIDE,EPINEPHRINE BITARTRATE 3 ML: 15; .005 INJECTION, SOLUTION EPIDURAL; INFILTRATION; INTRACAUDAL; PERINEURAL at 09:03

## 2020-03-01 RX ADMIN — BUPIVACAINE HYDROCHLORIDE 3 ML: 2.5 INJECTION, SOLUTION EPIDURAL; INFILTRATION; INTRACAUDAL; PERINEURAL at 09:03

## 2020-03-01 RX ADMIN — MISOPROSTOL 800 MCG: 200 TABLET ORAL at 11:03

## 2020-03-01 RX ADMIN — Medication 10 ML/HR: at 09:03

## 2020-03-02 LAB
ALBUMIN SERPL BCP-MCNC: 2.3 G/DL (ref 3.5–5.2)
ALP SERPL-CCNC: 292 U/L (ref 55–135)
ALT SERPL W/O P-5'-P-CCNC: 8 U/L (ref 10–44)
ANION GAP SERPL CALC-SCNC: 6 MMOL/L (ref 8–16)
AST SERPL-CCNC: 19 U/L (ref 10–40)
BASOPHILS # BLD AUTO: 0.02 K/UL (ref 0–0.2)
BASOPHILS NFR BLD: 0.1 % (ref 0–1.9)
BILIRUB SERPL-MCNC: 0.3 MG/DL (ref 0.1–1)
BUN SERPL-MCNC: 7 MG/DL (ref 6–20)
CALCIUM SERPL-MCNC: 8.2 MG/DL (ref 8.7–10.5)
CHLORIDE SERPL-SCNC: 108 MMOL/L (ref 95–110)
CO2 SERPL-SCNC: 19 MMOL/L (ref 23–29)
CREAT SERPL-MCNC: 0.9 MG/DL (ref 0.5–1.4)
CREAT UR-MCNC: 41 MG/DL (ref 15–325)
DIFFERENTIAL METHOD: ABNORMAL
EOSINOPHIL # BLD AUTO: 0 K/UL (ref 0–0.5)
EOSINOPHIL NFR BLD: 0 % (ref 0–8)
ERYTHROCYTE [DISTWIDTH] IN BLOOD BY AUTOMATED COUNT: 12.8 % (ref 11.5–14.5)
EST. GFR  (AFRICAN AMERICAN): >60 ML/MIN/1.73 M^2
EST. GFR  (NON AFRICAN AMERICAN): >60 ML/MIN/1.73 M^2
GLUCOSE SERPL-MCNC: 88 MG/DL (ref 70–110)
HCT VFR BLD AUTO: 27.3 % (ref 37–48.5)
HGB BLD-MCNC: 9.2 G/DL (ref 12–16)
IMM GRANULOCYTES # BLD AUTO: 0.05 K/UL (ref 0–0.04)
IMM GRANULOCYTES NFR BLD AUTO: 0.4 % (ref 0–0.5)
LDH SERPL L TO P-CCNC: 251 U/L (ref 110–260)
LYMPHOCYTES # BLD AUTO: 2.3 K/UL (ref 1–4.8)
LYMPHOCYTES NFR BLD: 17 % (ref 18–48)
MCH RBC QN AUTO: 28.8 PG (ref 27–31)
MCHC RBC AUTO-ENTMCNC: 33.7 G/DL (ref 32–36)
MCV RBC AUTO: 86 FL (ref 82–98)
MONOCYTES # BLD AUTO: 1 K/UL (ref 0.3–1)
MONOCYTES NFR BLD: 7 % (ref 4–15)
NEUTROPHILS # BLD AUTO: 10.3 K/UL (ref 1.8–7.7)
NEUTROPHILS NFR BLD: 75.5 % (ref 38–73)
NRBC BLD-RTO: 0 /100 WBC
PLATELET # BLD AUTO: 215 K/UL (ref 150–350)
PMV BLD AUTO: 10.6 FL (ref 9.2–12.9)
POTASSIUM SERPL-SCNC: 3.9 MMOL/L (ref 3.5–5.1)
PROT SERPL-MCNC: 5 G/DL (ref 6–8.4)
PROT UR-MCNC: 36 MG/DL
PROT/CREAT UR: 0.88 MG/G{CREAT} (ref 0–0.2)
RBC # BLD AUTO: 3.19 M/UL (ref 4–5.4)
RPR SER QL: NORMAL
SODIUM SERPL-SCNC: 133 MMOL/L (ref 136–145)
WBC # BLD AUTO: 13.62 K/UL (ref 3.9–12.7)

## 2020-03-02 PROCEDURE — 83615 LACTATE (LD) (LDH) ENZYME: CPT

## 2020-03-02 PROCEDURE — 51701 INSERT BLADDER CATHETER: CPT

## 2020-03-02 PROCEDURE — 63600175 PHARM REV CODE 636 W HCPCS: Performed by: OBSTETRICS & GYNECOLOGY

## 2020-03-02 PROCEDURE — 25000003 PHARM REV CODE 250: Performed by: OBSTETRICS & GYNECOLOGY

## 2020-03-02 PROCEDURE — 99232 PR SUBSEQUENT HOSPITAL CARE,LEVL II: ICD-10-PCS | Mod: ,,, | Performed by: OBSTETRICS & GYNECOLOGY

## 2020-03-02 PROCEDURE — 80053 COMPREHEN METABOLIC PANEL: CPT

## 2020-03-02 PROCEDURE — 72200004 HC VAGINAL DELIVERY LEVEL I

## 2020-03-02 PROCEDURE — 99232 SBSQ HOSP IP/OBS MODERATE 35: CPT | Mod: ,,, | Performed by: OBSTETRICS & GYNECOLOGY

## 2020-03-02 PROCEDURE — 11000001 HC ACUTE MED/SURG PRIVATE ROOM

## 2020-03-02 PROCEDURE — 85025 COMPLETE CBC W/AUTO DIFF WBC: CPT

## 2020-03-02 PROCEDURE — 84156 ASSAY OF PROTEIN URINE: CPT

## 2020-03-02 RX ORDER — HYDROCODONE BITARTRATE AND ACETAMINOPHEN 10; 325 MG/1; MG/1
1 TABLET ORAL EVERY 4 HOURS PRN
Status: DISCONTINUED | OUTPATIENT
Start: 2020-03-02 | End: 2020-03-03 | Stop reason: HOSPADM

## 2020-03-02 RX ORDER — FERROUS SULFATE 325(65) MG
325 TABLET, DELAYED RELEASE (ENTERIC COATED) ORAL DAILY
Status: DISCONTINUED | OUTPATIENT
Start: 2020-03-02 | End: 2020-03-03 | Stop reason: HOSPADM

## 2020-03-02 RX ORDER — ACETAMINOPHEN 500 MG
1000 TABLET ORAL ONCE
Status: COMPLETED | OUTPATIENT
Start: 2020-03-02 | End: 2020-03-02

## 2020-03-02 RX ORDER — DOCUSATE SODIUM 100 MG/1
200 CAPSULE, LIQUID FILLED ORAL 2 TIMES DAILY PRN
Status: DISCONTINUED | OUTPATIENT
Start: 2020-03-02 | End: 2020-03-03 | Stop reason: HOSPADM

## 2020-03-02 RX ORDER — DIPHENHYDRAMINE HYDROCHLORIDE 50 MG/ML
25 INJECTION INTRAMUSCULAR; INTRAVENOUS EVERY 4 HOURS PRN
Status: DISCONTINUED | OUTPATIENT
Start: 2020-03-02 | End: 2020-03-03 | Stop reason: HOSPADM

## 2020-03-02 RX ORDER — SIMETHICONE 80 MG
1 TABLET,CHEWABLE ORAL EVERY 6 HOURS PRN
Status: DISCONTINUED | OUTPATIENT
Start: 2020-03-02 | End: 2020-03-03 | Stop reason: HOSPADM

## 2020-03-02 RX ORDER — PRENATAL WITH FERROUS FUM AND FOLIC ACID 3080; 920; 120; 400; 22; 1.84; 3; 20; 10; 1; 12; 200; 27; 25; 2 [IU]/1; [IU]/1; MG/1; [IU]/1; MG/1; MG/1; MG/1; MG/1; MG/1; MG/1; UG/1; MG/1; MG/1; MG/1; MG/1
1 TABLET ORAL DAILY
Status: DISCONTINUED | OUTPATIENT
Start: 2020-03-02 | End: 2020-03-03 | Stop reason: HOSPADM

## 2020-03-02 RX ORDER — IBUPROFEN 600 MG/1
600 TABLET ORAL EVERY 6 HOURS
Status: DISCONTINUED | OUTPATIENT
Start: 2020-03-02 | End: 2020-03-03 | Stop reason: HOSPADM

## 2020-03-02 RX ORDER — DIPHENHYDRAMINE HCL 25 MG
25 CAPSULE ORAL EVERY 4 HOURS PRN
Status: DISCONTINUED | OUTPATIENT
Start: 2020-03-02 | End: 2020-03-03 | Stop reason: HOSPADM

## 2020-03-02 RX ORDER — OXYCODONE AND ACETAMINOPHEN 5; 325 MG/1; MG/1
1 TABLET ORAL EVERY 4 HOURS PRN
Status: DISCONTINUED | OUTPATIENT
Start: 2020-03-02 | End: 2020-03-03 | Stop reason: HOSPADM

## 2020-03-02 RX ORDER — HYDROCORTISONE 25 MG/G
CREAM TOPICAL 3 TIMES DAILY PRN
Status: DISCONTINUED | OUTPATIENT
Start: 2020-03-02 | End: 2020-03-03 | Stop reason: HOSPADM

## 2020-03-02 RX ORDER — ONDANSETRON 8 MG/1
8 TABLET, ORALLY DISINTEGRATING ORAL EVERY 8 HOURS PRN
Status: DISCONTINUED | OUTPATIENT
Start: 2020-03-02 | End: 2020-03-03 | Stop reason: HOSPADM

## 2020-03-02 RX ORDER — OXYTOCIN/RINGER'S LACTATE 30/500 ML
95 PLASTIC BAG, INJECTION (ML) INTRAVENOUS ONCE
Status: COMPLETED | OUTPATIENT
Start: 2020-03-02 | End: 2020-03-01

## 2020-03-02 RX ORDER — ACETAMINOPHEN 325 MG/1
650 TABLET ORAL EVERY 6 HOURS PRN
Status: DISCONTINUED | OUTPATIENT
Start: 2020-03-02 | End: 2020-03-03 | Stop reason: HOSPADM

## 2020-03-02 RX ADMIN — IBUPROFEN 600 MG: 600 TABLET, FILM COATED ORAL at 06:03

## 2020-03-02 RX ADMIN — BENZOCAINE AND LEVOMENTHOL: 200; 5 SPRAY TOPICAL at 04:03

## 2020-03-02 RX ADMIN — OXYCODONE HYDROCHLORIDE AND ACETAMINOPHEN 1 TABLET: 5; 325 TABLET ORAL at 12:03

## 2020-03-02 RX ADMIN — IBUPROFEN 600 MG: 600 TABLET, FILM COATED ORAL at 05:03

## 2020-03-02 RX ADMIN — DIPHENHYDRAMINE HYDROCHLORIDE 25 MG: 25 CAPSULE ORAL at 12:03

## 2020-03-02 RX ADMIN — PRENATAL VIT W/ FE FUMARATE-FA TAB 27-0.8 MG 1 TABLET: 27-0.8 TAB at 08:03

## 2020-03-02 RX ADMIN — ACETAMINOPHEN 1000 MG: 500 TABLET ORAL at 03:03

## 2020-03-02 RX ADMIN — DOCUSATE SODIUM 200 MG: 100 CAPSULE, LIQUID FILLED ORAL at 08:03

## 2020-03-02 RX ADMIN — FERROUS SULFATE TAB EC 325 MG (65 MG FE EQUIVALENT) 325 MG: 325 (65 FE) TABLET DELAYED RESPONSE at 08:03

## 2020-03-02 RX ADMIN — IBUPROFEN 600 MG: 600 TABLET, FILM COATED ORAL at 12:03

## 2020-03-02 RX ADMIN — OXYCODONE HYDROCHLORIDE AND ACETAMINOPHEN 1 TABLET: 5; 325 TABLET ORAL at 08:03

## 2020-03-02 RX ADMIN — OXYCODONE HYDROCHLORIDE AND ACETAMINOPHEN 1 TABLET: 5; 325 TABLET ORAL at 10:03

## 2020-03-02 NOTE — HOSPITAL COURSE
She was 4 cm on admission and progressed to 8-9 cm in 2 hours  2020: PPD#1 s/p . Patient reports that she is doing well. She is ambulating, voiding and tolerating PO. She reports bleeding is like a period. She is breast feeding and bottle feeding her female infant.   2020: PPD#2 s/p . Patient reports that she is doing well. She is ambulating, voiding and tolerating PO. She reports bleeding is like a period. She is breast feeding and bottle feeding her female infant.

## 2020-03-02 NOTE — ANESTHESIA PREPROCEDURE EVALUATION
03/01/2020  Earnest Islas is a 19 y.o., female.    Anesthesia Evaluation     I have reviewed the Nursing Notes.      Review of Systems  Anesthesia Hx:  No problems with previous Anesthesia   Social:  Non-Smoker    Cardiovascular:  Cardiovascular Normal Exercise tolerance: good     Pulmonary:  Pulmonary Normal    Renal/:  Renal/ Normal     Hepatic/GI:   No Bowel Prep. Denies PUD. Denies Liver Disease. Denies Hepatitis.    Neurological:  Neurology Normal    Endocrine:  Endocrine Normal        Physical Exam  General:  Well nourished    Airway/Jaw/Neck:  AIRWAY FINDINGS: Normal      Chest/Lungs:  Chest/Lungs Clear    Heart/Vascular:  Heart Findings: Normal       Mental Status:  Mental Status Findings: Normal        Anesthesia Plan  Type of Anesthesia, risks & benefits discussed:  Anesthesia Type:  epidural  Patient's Preference:   Intra-op Monitoring Plan: standard ASA monitors  Intra-op Monitoring Plan Comments:   Post Op Pain Control Plan:   Post Op Pain Control Plan Comments:   Induction:    Beta Blocker:  Patient is not currently on a Beta-Blocker (No further documentation required).       Informed Consent: Patient understands risks and agrees with Anesthesia plan.  Questions answered. Anesthesia consent signed with patient.  ASA Score: 2     Day of Surgery Review of History & Physical:  There are no significant changes.  H&P update referred to the provider.         Ready For Surgery From Anesthesia Perspective.

## 2020-03-02 NOTE — ANESTHESIA POSTPROCEDURE EVALUATION
Anesthesia Post Evaluation    Patient: Earnest Islas    Procedure(s) Performed: * No procedures listed *    Final Anesthesia Type: epidural    Patient location during evaluation: labor & delivery  Patient participation: Yes- Able to Participate  Level of consciousness: awake and alert  Post-procedure vital signs: reviewed and stable  Pain management: adequate  Airway patency: patent    PONV status at discharge: No PONV  Anesthetic complications: no      Cardiovascular status: blood pressure returned to baseline and hemodynamically stable  Respiratory status: unassisted and spontaneous ventilation  Hydration status: euvolemic  Follow-up not needed.          Vitals Value Taken Time   /76 3/2/2020  3:15 AM   Temp 38.4 °C (101.2 °F) 3/2/2020  3:15 AM   Pulse 77 3/2/2020  3:15 AM   Resp 18 3/2/2020  3:15 AM   SpO2 99 % 3/2/2020  2:00 AM   Vitals shown include unvalidated device data.      No case tracking events are documented in the log.      Pain/Jose Score: Pain Rating Prior to Med Admin: 5 (3/2/2020  3:59 AM)

## 2020-03-02 NOTE — LACTATION NOTE
"This note was copied from a baby's chart.  Rounded on pt and offered breastfeeding assist again.  States that she just formula fed and may try to breastfeed later.  Discussed breast stimulation/milk production and encouraged to breastfeed on cue 8 - 10 times in 24 hours.  States "will call if I need help".  "

## 2020-03-02 NOTE — NURSING
: RN at bs to assess pt. Cervical exam /-2, soft, anterior, physical assessment unremarkable.  : Called placed to DR Waldron to discuss pt condition. RN given orders to admit pt and call Dr Monahan to manage pt care. RN confirmed readback.  : RN calling DR Monahan, message left with voicemail. Will continue to monitor pt.  : Second call placed to DR Monahan, second message left with voicemail. Will continue to monitor pt.  : Call placed to DR Waldron to update on pt status. No new orders received at this time, will continue to monitor pt.  : RN at bs, pt states she feels the need to push, SVE by RN, pt C/C/+2, bulging bag noted.  : Dr Waldron called, updated on above.  : Dr Waldron at bs, SVE by Dr Waldron, AROM completed at , 8.5/100/0 with contractions, clear fluid noted. RN discussing pt request for epidural, Dr Waldron stated she would like to see how patient progresses and will determine if she can receive an epidural after the next exam.  : SVE by Dr Waldron, 7cm when not uli, Dr Waldron states pt can receive epidural  : Anesthesia at bs for epidural placement. Completed without complication.  : Return call from Dr Monahan received. MD states she would like to be present for patient delivery. Dr Waldron stated she would cover. This RN asking MD's to discuss amongst themselves who will be present for delivery and to notify this RN.  : Dr Waldron to relinquish care to Dr Monahan. This RN to call Dr Monahan for delivery.  : RN at bs, SVE C/C/+1, started pushing with pt at , Dr Monahan notified.  : Dr Monahan at bs, pushing with pt  : Viable female infant delivered by Dr Monahan.  0: Epidural catheter pulled, tip intact, no resistance met.  : up to bathroom, pt gait steady, no dizziness, loc, sob noted. [place] care teaching and physical care completed, pt voided 200mL clear yellow urine, first void following , no concerns at this time, will continue to  monitor.  0252: Report called to GREY Cantu MBC. To transfer.

## 2020-03-02 NOTE — ANESTHESIA PROCEDURE NOTES
Epidural    Patient location during procedure: OB   Reason for block: primary anesthetic   Diagnosis: IUP   Start time: 3/1/2020 9:08 PM  Timeout: 3/1/2020 9:08 PM  End time: 3/1/2020 9:28 PM    Staffing  Performing Provider: Joshua Torrez MD  Authorizing Provider: Joshua Torrez MD        Preanesthetic Checklist  Completed: patient identified, pre-op evaluation, timeout performed, IV checked, risks and benefits discussed, monitors and equipment checked, anesthesia consent given, hand hygiene performed and patient being monitored  Preparation  Patient position: sitting  Prep: ChloraPrep  Patient monitoring: ECG, Pulse Ox and Blood Pressure  Epidural  Skin Anesthetic: lidocaine 1%  Skin Wheal: 3 mL  Administration type: continuous  Approach: midline  Interspace: L4-5    Injection technique: BIANKA saline  Needle and Epidural Catheter  Needle type: Tuohy   Needle gauge: 17  Needle length: 3.5 inches  Needle insertion depth: 8 cm  Catheter type: springwound and multi-orifice  Catheter size: 19 G  Catheter at skin depth: 13 cm  Test dose: 3 mL of lidocaine 1.5% with Epi 1-to-200,000  Additional Documentation: no paresthesia on injection, no significant pain on injection, negative aspiration for heme and CSF, no significant complaints from patient and no signs/symptoms of IV or SA injection  Needle localization: anatomical landmarks  Medications:  Volume per aspiration: 3 mL  Time between aspirations: 3 minutes  Assessment  Ease of block: easy  Patient's tolerance of the procedure: comfortable throughout block and no complaintsNo inadvertent dural puncture with Tuohy.  Dural puncture performed with spinal needle (25 gauge 4 inch spinal used to confirm midline).

## 2020-03-02 NOTE — PROGRESS NOTES
Received report from LILIYA Lopez RN. Transferred from L&D via [facility] by nurse. Accompanied by significant other and  in open crib.  Admitted to room # 241, placed in bed, assessment done per flowsheet

## 2020-03-02 NOTE — H&P
[facility]  Obstetrics  History & Physical    Patient Name: Earnest Islas  MRN: [id number]  Admission Date: 3/1/2020  Primary Care Provider: Dean Jensen MD    Subjective:     Principal Problem:Uterine contractions during pregnancy    History of Present Illness:  Pt is a 18 yo  @ 39w6d presented to L&D with painful contractions since 11 am      Obstetric HPI:  Patient reports painful contractions, active fetal movement, No vaginal bleeding , No loss of fluid     This pregnancy has been complicated by anemia    OB History    Para Term  AB Living   1 0 0 0 0 0   SAB TAB Ectopic Multiple Live Births   0 0 0 0 0      # Outcome Date GA Lbr Chad/2nd Weight Sex Delivery Anes PTL Lv   1 Current              Past Medical History:   Diagnosis Date    Anemia      Past Surgical History:   Procedure Laterality Date    WISDOM TOOTH EXTRACTION         PTA Medications   Medication Sig    buPROPion (WELLBUTRIN XL) 150 MG TB24 tablet Take 1 tablet for one week then increase to 2 tablets.    ferrous sulfate 325 (65 FE) MG EC tablet Take 1 tablet (325 mg total) by mouth once daily.    prenatal 26-iron ps-folic-dha (VITAFOL-ONE) 29 mg iron- 1 mg-200 mg Cap Take 1 capsule by mouth once daily.    metoclopramide HCl (REGLAN) 5 MG tablet Take 1 tablet (5 mg total) by mouth 4 (four) times daily as needed (nausea). (Patient not taking: Reported on 2019)    ondansetron (ZOFRAN-ODT) 4 MG TbDL Take 2 tablets (8 mg total) by mouth every 8 (eight) hours. (Patient not taking: Reported on 2019)    sertraline (ZOLOFT) 50 MG tablet Take 1 tablet (50 mg total) by mouth once daily.       Review of patient's allergies indicates:  No Known Allergies     Family History     Problem Relation (Age of Onset)    Breast cancer Maternal Grandmother    Cancer Brother        Tobacco Use    Smoking status: Former Smoker     Types: Cigarettes     Last attempt to quit: 2020     Years since quitting:  0.1    Smokeless tobacco: Never Used    Tobacco comment: pt states she has smoked during this pregnancy but stopped in January of this year   Substance and Sexual Activity    Alcohol use: Not Currently     Comment: 0cc    Drug use: No    Sexual activity: Yes     Birth control/protection: None     Review of Systems   Constitutional: Negative for appetite change, chills and fever.   Respiratory: Negative for shortness of breath.    Cardiovascular: Negative for chest pain.   Gastrointestinal: Negative for abdominal pain, blood in stool, constipation, diarrhea, nausea and vomiting.   Endocrine: Negative for hair loss and hot flashes.   Genitourinary: Positive for pelvic pain. Negative for decreased libido, dysmenorrhea, dyspareunia, dysuria, genital sores, menorrhagia, menstrual problem, vaginal discharge, vaginal pain, urinary incontinence and vaginal odor.   Musculoskeletal: Negative for back pain.   Integumentary:  Negative for rash, acne, hair changes, breast mass, nipple discharge and breast skin changes.   Breast: Negative for mass, mastodynia, nipple discharge and skin changes     Objective:     Vital Signs (Most Recent):  Pulse: 71 (03/01/20 2001)  BP: (!) 180/97 (03/01/20 2001)  SpO2: 100 % (03/01/20 2000) Vital Signs (24h Range):  Pulse:  [56-84] 71  SpO2:  [100 %] 100 %  BP: (134-185)/(87-97) 180/97     Weight: 65.8 kg (145 lb)  Body mass index is 28.32 kg/m².    FHT: 130 bpm, mod liliane, +accels, no decels. Cat 1 (reassuring)  TOCO:  Q 2-3 minutes    Physical Exam:   Constitutional: She is oriented to person, place, and time. She appears well-developed and well-nourished.    HENT:   Head: Normocephalic and atraumatic.    Eyes: Pupils are equal, round, and reactive to light. EOM are normal.     Cardiovascular: Exam reveals no clubbing and no cyanosis.     Pulmonary/Chest: Effort normal.        Abdominal: Soft. She exhibits no mass. There is no rebound and no guarding. No hernia.   gravid              Musculoskeletal: Normal range of motion and moves all extremeties.       Neurological: She is alert and oriented to person, place, and time.    Skin: Skin is warm. No cyanosis. Nails show no clubbing.    Psychiatric: She has a normal mood and affect. Her behavior is normal. Thought content normal.       Cervix:  Dilation:  8.5  Effacement:  90%  Station: 0  Presentation: Vertex, arom with clear fluid     Significant Labs:  Lab Results   Component Value Date    GROUPTRH A POS 2019    HEPBSAG Negative 2019    STREPBCULT No Group B Streptococcus isolated 2020       I have personallly reviewed all pertinent lab results from the last 24 hours.    Assessment/Plan:     19 y.o. female  at 39w6d for:    Uterine contractions during pregnancy  Active labor  Anticipate vaginal delivery        Alycia Waldron MD  Obstetrics  [facility]

## 2020-03-02 NOTE — PROGRESS NOTES
03/01/20 2243   [facility] Note - Strip   Strip Reviewed by [facility] Nurse? Yes   [facility] Note - Communication   [facility] Nurse Communicated with Bedside Nurse Regarding: Fetal Status  (Prolonged decel)   [facility] Note - Notification   Nurse Notified? Yes  (Nurse at BS)   Name of Nurse Charge Nurse   Doctor Notified? No

## 2020-03-02 NOTE — LACTATION NOTE
This note was copied from a baby's chart.  After working with mother for an hour trying to get her baby to latch. The mother decided not to breast feed her baby. Talked with mother not to give up and that her baby needs more time to learn how to suck. Her baby tends to keep pushing everything out of her mouth, and she constantly pushes her tongue forward. She wouldn't even latch with the nipple shield. The baby took 15 ml via a syringe, and it took awhile to that. The mother wanted to give the baby a pacifier, but convinced her not to do that. The baby just needs more time, she wants to feed, but not able to do so just yet. She agreed to see lactation in the morning.

## 2020-03-02 NOTE — TREATMENT PLAN
"Pt presents to unit with c/o losing her mucus plug @ 1100 and contractions coming "back to back". History reviewed with pt. Pt stated she saw Dr. Monahan on Thursday and was 2 cm. EFMx2 placed, positive FHT noted. Will give report to night shift to triage pt and check cervix.   "

## 2020-03-02 NOTE — PLAN OF CARE
"Spoke with pt in room.  Baby asleep at this time, without signs of hunger cues. Reviewed breastfeeding basics.  Encouraged to call to call for latch check.  Encouraged breastfeeding on demand, 8 -12 times in 24 hours.  Call for assist prn.  Requests to offer bottles of formula prn.  Discussed risks associated with formula feeding on the course of breastfeeding. States "understand".  "

## 2020-03-02 NOTE — SUBJECTIVE & OBJECTIVE
Obstetric HPI:  Patient reports painful contractions, active fetal movement, No vaginal bleeding , No loss of fluid     This pregnancy has been complicated by anemia    OB History    Para Term  AB Living   1 0 0 0 0 0   SAB TAB Ectopic Multiple Live Births   0 0 0 0 0      # Outcome Date GA Lbr Chad/2nd Weight Sex Delivery Anes PTL Lv   1 Current              Past Medical History:   Diagnosis Date    Anemia      Past Surgical History:   Procedure Laterality Date    WISDOM TOOTH EXTRACTION         PTA Medications   Medication Sig    buPROPion (WELLBUTRIN XL) 150 MG TB24 tablet Take 1 tablet for one week then increase to 2 tablets.    ferrous sulfate 325 (65 FE) MG EC tablet Take 1 tablet (325 mg total) by mouth once daily.    prenatal 26-iron ps-folic-dha (VITAFOL-ONE) 29 mg iron- 1 mg-200 mg Cap Take 1 capsule by mouth once daily.    metoclopramide HCl (REGLAN) 5 MG tablet Take 1 tablet (5 mg total) by mouth 4 (four) times daily as needed (nausea). (Patient not taking: Reported on 2019)    ondansetron (ZOFRAN-ODT) 4 MG TbDL Take 2 tablets (8 mg total) by mouth every 8 (eight) hours. (Patient not taking: Reported on 2019)    sertraline (ZOLOFT) 50 MG tablet Take 1 tablet (50 mg total) by mouth once daily.       Review of patient's allergies indicates:  No Known Allergies     Family History     Problem Relation (Age of Onset)    Breast cancer Maternal Grandmother    Cancer Brother        Tobacco Use    Smoking status: Former Smoker     Types: Cigarettes     Last attempt to quit: 2020     Years since quittin.1    Smokeless tobacco: Never Used    Tobacco comment: pt states she has smoked during this pregnancy but stopped in January of this year   Substance and Sexual Activity    Alcohol use: Not Currently     Comment: 0cc    Drug use: No    Sexual activity: Yes     Birth control/protection: None     Review of Systems   Constitutional: Negative for appetite change, chills  and fever.   Respiratory: Negative for shortness of breath.    Cardiovascular: Negative for chest pain.   Gastrointestinal: Negative for abdominal pain, blood in stool, constipation, diarrhea, nausea and vomiting.   Endocrine: Negative for hair loss and hot flashes.   Genitourinary: Positive for pelvic pain. Negative for decreased libido, dysmenorrhea, dyspareunia, dysuria, genital sores, menorrhagia, menstrual problem, vaginal discharge, vaginal pain, urinary incontinence and vaginal odor.   Musculoskeletal: Negative for back pain.   Integumentary:  Negative for rash, acne, hair changes, breast mass, nipple discharge and breast skin changes.   Breast: Negative for mass, mastodynia, nipple discharge and skin changes     Objective:     Vital Signs (Most Recent):  Pulse: 71 (03/01/20 2001)  BP: (!) 180/97 (03/01/20 2001)  SpO2: 100 % (03/01/20 2000) Vital Signs (24h Range):  Pulse:  [56-84] 71  SpO2:  [100 %] 100 %  BP: (134-185)/(87-97) 180/97     Weight: 65.8 kg (145 lb)  Body mass index is 28.32 kg/m².    FHT: 130 bpm, mod liliane, +accels, no decels. Cat 1 (reassuring)  TOCO:  Q 2-3 minutes    Physical Exam:   Constitutional: She is oriented to person, place, and time. She appears well-developed and well-nourished.    HENT:   Head: Normocephalic and atraumatic.    Eyes: Pupils are equal, round, and reactive to light. EOM are normal.     Cardiovascular: Exam reveals no clubbing and no cyanosis.     Pulmonary/Chest: Effort normal.        Abdominal: Soft. She exhibits no mass. There is no rebound and no guarding. No hernia.   gravid             Musculoskeletal: Normal range of motion and moves all extremeties.       Neurological: She is alert and oriented to person, place, and time.    Skin: Skin is warm. No cyanosis. Nails show no clubbing.    Psychiatric: She has a normal mood and affect. Her behavior is normal. Thought content normal.       Cervix:  Dilation:  8.5  Effacement:  90%  Station: 0  Presentation: Vertex,  arom with clear fluid     Significant Labs:  Lab Results   Component Value Date    GROUPTRH A POS 09/13/2019    HEPBSAG Negative 09/13/2019    STREPBCULT No Group B Streptococcus isolated 02/06/2020       I have personallly reviewed all pertinent lab results from the last 24 hours.

## 2020-03-03 VITALS
HEART RATE: 53 BPM | DIASTOLIC BLOOD PRESSURE: 75 MMHG | RESPIRATION RATE: 18 BRPM | SYSTOLIC BLOOD PRESSURE: 137 MMHG | WEIGHT: 147 LBS | OXYGEN SATURATION: 99 % | HEIGHT: 62 IN | BODY MASS INDEX: 27.05 KG/M2 | TEMPERATURE: 98 F

## 2020-03-03 PROBLEM — O13.9 GESTATIONAL HYPERTENSION: Status: ACTIVE | Noted: 2020-03-03

## 2020-03-03 PROCEDURE — 25000003 PHARM REV CODE 250: Performed by: OBSTETRICS & GYNECOLOGY

## 2020-03-03 PROCEDURE — 99238 PR HOSPITAL DISCHARGE DAY,<30 MIN: ICD-10-PCS | Mod: ,,, | Performed by: OBSTETRICS & GYNECOLOGY

## 2020-03-03 PROCEDURE — 99238 HOSP IP/OBS DSCHRG MGMT 30/<: CPT | Mod: ,,, | Performed by: OBSTETRICS & GYNECOLOGY

## 2020-03-03 RX ORDER — IBUPROFEN 800 MG/1
800 TABLET ORAL EVERY 8 HOURS PRN
Qty: 40 TABLET | Refills: 0 | Status: SHIPPED | OUTPATIENT
Start: 2020-03-03 | End: 2022-06-10

## 2020-03-03 RX ORDER — OXYCODONE AND ACETAMINOPHEN 5; 325 MG/1; MG/1
1 TABLET ORAL EVERY 6 HOURS PRN
Refills: 0 | OUTPATIENT
Start: 2020-03-03

## 2020-03-03 RX ADMIN — IBUPROFEN 600 MG: 600 TABLET, FILM COATED ORAL at 11:03

## 2020-03-03 RX ADMIN — IBUPROFEN 600 MG: 600 TABLET, FILM COATED ORAL at 06:03

## 2020-03-03 RX ADMIN — IBUPROFEN 600 MG: 600 TABLET, FILM COATED ORAL at 12:03

## 2020-03-03 RX ADMIN — PRENATAL VIT W/ FE FUMARATE-FA TAB 27-0.8 MG 1 TABLET: 27-0.8 TAB at 09:03

## 2020-03-03 RX ADMIN — FERROUS SULFATE TAB EC 325 MG (65 MG FE EQUIVALENT) 325 MG: 325 (65 FE) TABLET DELAYED RESPONSE at 09:03

## 2020-03-03 RX ADMIN — OXYCODONE HYDROCHLORIDE AND ACETAMINOPHEN 1 TABLET: 5; 325 TABLET ORAL at 11:03

## 2020-03-03 NOTE — DISCHARGE INSTRUCTIONS
After vaginal delivery:    Regular diet  Drink 6-8 glasses of water a day  Shower only for next 6 weeks.  If perineum sore, may soak in a few inches of warm water in tub.  No lifting anything more than 10 pounds.  No driving for 1 week  Walking is the only exercise allowed for 6 weeks  No sexual intercourse, no tampons, no douching for 6 weeks  Discuss with your doctor your birth control preferences at next visit  Wear supportive bra    Notify doctor if you have:  -Fever of 101 or higher  -Persistent nausea and vomiting  -Heavy vaginal bleeding or clots  -Swelling and pain in arms or legs  -Severe headaches, blurred vision, or fainting  -Frequency and burning with urination    Management of Engorgement in the Non-Nursing Mother    Your breast milk will usually come in within 3-5 days after delivery even if you have decided not to breastfeed.  Your breasts may become full, lumpy, hard and uncomfortable due to the glands filling with milk and swelling of the breast tissue, blood vessels, and lymph glands.  This is a temporary condition usually lasting 24-48 hrs.  If your breasts become uncomfortable during this time, you may use some or all of the comfort measures described below to obtain relief.    Measures to relieve discomfort:    1. Wear a well fitting supportive bra. It should not be too tight or it may lead to plugged ducts or a breast infection.  (We do not recommend that you bind your breasts with any type of wrap.)    2. If the breasts begin to feel heavy, warm or uncomfortably full, begin using cold compresses on your breasts. Cold compresses can relieve the swelling and provide comfort.  Cold application can be in the form of ice packs, gel packs, frozen bags of vegetables or frozen wet towels. Cold compresses should be wrapped in a thin towel or a pillow case and applied to the breasts for 20 minutes at a time. This process can be repeated with a short break in between the applications of cold compresses  until the swelling is relieved.     3.  Cold cabbage compresses can also be used to relieve pain and swelling.  Chilled cabbage leaves show similar pain reducing effects as cold compresses.  Some mothers prefer the cabbage leaves due to a stronger, more immediate effect. Cabbage leave effects are not clinically proven but many mothers find them very soothing.    How to apply chilled cabbage compresses:  rinse with cool water and refrigerate raw cabbage leaves.  Strip the large vein off the cold cabbage leaf and put the remaining part of the cabbage leaf directly on the breast. The leaves should be worn inside the bra until they wilt, usually within 2-4 hours.  When they wilt they should be replaced with fresh leaves.   If redness, rash, or itching occur stop use immediately.    4. Anti-inflammatory medications such as ibuprofen may be taken, with your physicians approval, to reduce inflammation and discomfort.     6. If fullness persists and remains painful even after all of the above measures are used, hand expressing a small amount of milk out of the breasts can provide an extra measure of comfort.  Warm showers, letting the warm water hit your back and roll over your shoulders to the breasts, while you gently express milk can make hand expressing a little easier. You should only remove enough milk to provide comfort and relief.   Repeat this process as often as needed to keep the breasts comfortable.    7. You can pump your breasts and remove just enough milk to feel softer, but not so much that more milk production is stimulated.   Repeat this process as often as needed to keep the breasts comfortable.    8. There is no need to limit the amount of fluids that you drink.       9. Engorgement will usually get better within 24-48 hrs; however, there may be some fullness and/or leaking of milk for several days. Wear breast pads to absorb any leaking milk and change them frequently.    10. If you have any flu-like  symptoms such as fever, chills, feeling tired and achy or red areas on your breast, call your physician immediately.  11.Call the [facility], [phone], if the engorgement is not relieved or if you have questions.

## 2020-03-03 NOTE — PROGRESS NOTES
[facility]  Obstetrics  Postpartum Progress Note    Patient Name: Earnest Islas  MRN: [id number]  Admission Date: 3/1/2020  Hospital Length of Stay: 2 days  Attending Physician: Aletha Pang,*  Primary Care Provider: Dean Jensen MD    Subjective:     Principal Problem: (spontaneous vaginal delivery)    Hospital course: She was 4 cm on admission and progressed to 8-9 cm in 2 hours  2020: PPD#1 s/p . Patient reports that she is doing well. She is ambulating, voiding and tolerating PO. She reports bleeding is like a period. She is breast feeding and bottle feeding her female infant.   2020: PPD#2 s/p . Patient reports that she is doing well. She is ambulating, voiding and tolerating PO. She reports bleeding is like a period. She is breast feeding and bottle feeding her female infant.       Interval History:   She is doing well this morning. She is tolerating a regular diet without nausea or vomiting. She is voiding spontaneously. She is ambulating. She has passed flatus, and has not a BM. Vaginal bleeding is mild. She denies fever or chills. Abdominal pain is mild and controlled with oral medications. She is not breastfeeding. She desires circumcision for her male baby: no.    Objective:     Vital Signs (Most Recent):  Temp: 98.4 °F (36.9 °C) (20 1157)  Pulse: (!) 53 (20 1157)  Resp: 18 (20 1157)  BP: 137/75 (20 1157)  SpO2: 99 % (20 1157) Vital Signs (24h Range):  Temp:  [96.5 °F (35.8 °C)-98.5 °F (36.9 °C)] 98.4 °F (36.9 °C)  Pulse:  [53-56] 53  Resp:  [16-20] 18  SpO2:  [98 %-100 %] 99 %  BP: (137-154)/(66-85) 137/75     Weight: 66.7 kg (147 lb)  Body mass index is 26.89 kg/m².      Intake/Output Summary (Last 24 hours) at 3/3/2020 1510  Last data filed at 3/2/2020 1719  Gross per 24 hour   Intake 520 ml   Output 400 ml   Net 120 ml       Significant Labs:  Lab Results   Component Value Date    GROUPTR A POS 2020    HEPBSAG  Negative 2019    STREPBCULT No Group B Streptococcus isolated 2020     Recent Labs   Lab 20  0528   HGB 9.2*   HCT 27.3*       CBC:   Recent Labs   Lab 20  0528   WBC 13.62*   RBC 3.19*   HGB 9.2*   HCT 27.3*      MCV 86   MCH 28.8   MCHC 33.7     I have personallly reviewed all pertinent lab results from the last 24 hours.    Physical Exam:   Constitutional: She is oriented to person, place, and time. She appears well-developed and well-nourished.       Cardiovascular: Normal rate.     Pulmonary/Chest: Effort normal. No respiratory distress.        Abdominal: Soft. She exhibits no distension and no abdominal incision.     Genitourinary:   Genitourinary Comments: Uterus below the umbilicus               Neurological: She is alert and oriented to person, place, and time.    Skin: Skin is warm and dry.    Psychiatric: She has a normal mood and affect.       Assessment/Plan:     19 y.o. female  for:    *  (spontaneous vaginal delivery)  Postpartum care:  - Patient doing well. Continue routine management and advances.  - Continue PO pain meds. Pain well controlled.  - Encourage ambulation.   - Heme: Pre Delivery h/h 10.5/32 --> Post Delivery h/h 9.2/  - Contraception - Nexplanon  - Lactation - The patient is Breast and bottle feeding. Lactation nurse following along PRN  - Rh Status - APOS            Gestational hypertension  - BP: (142-154)/(66-85) 154/82  - P/C 0.88 clean catch although asked to obtain as cath specimen because of postpartum bleeding would skew results  - Asymptomatic           Disposition: As patient meets milestones, will plan to discharge today.    Aletha Pang MD  Obstetrics  [facility]

## 2020-03-03 NOTE — TELEPHONE ENCOUNTER
"Received call from pt. Pt reports being discharged from the hospital today and is in pain, rates 7/10 and reported as "cramping". Pt is requesting Percocet, states that is what was given in the hospital and she would like to continue taking it at home. Will forward request to Dr. Monahan.  "

## 2020-03-03 NOTE — SUBJECTIVE & OBJECTIVE
Hospital course: She was 4 cm on admission and progressed to 8-9 cm in 2 hours  2020: PPD#1 s/p . Patient reports that she is doing well. She is ambulating, voiding and tolerating PO. She reports bleeding is like a period. She is breast feeding and bottle feeding her female infant.   2020: PPD#2 s/p . Patient reports that she is doing well. She is ambulating, voiding and tolerating PO. She reports bleeding is like a period. She is breast feeding and bottle feeding her female infant.       Interval History:   She is doing well this morning. She is tolerating a regular diet without nausea or vomiting. She is voiding spontaneously. She is ambulating. She has passed flatus, and has not a BM. Vaginal bleeding is mild. She denies fever or chills. Abdominal pain is mild and controlled with oral medications. She is not breastfeeding. She desires circumcision for her male baby: no.    Objective:     Vital Signs (Most Recent):  Temp: 98.4 °F (36.9 °C) (20 1157)  Pulse: (!) 53 (20 1157)  Resp: 18 (20 1157)  BP: 137/75 (20 1157)  SpO2: 99 % (20 1157) Vital Signs (24h Range):  Temp:  [96.5 °F (35.8 °C)-98.5 °F (36.9 °C)] 98.4 °F (36.9 °C)  Pulse:  [53-56] 53  Resp:  [16-20] 18  SpO2:  [98 %-100 %] 99 %  BP: (137-154)/(66-85) 137/75     Weight: 66.7 kg (147 lb)  Body mass index is 26.89 kg/m².      Intake/Output Summary (Last 24 hours) at 3/3/2020 1510  Last data filed at 3/2/2020 1719  Gross per 24 hour   Intake 520 ml   Output 400 ml   Net 120 ml       Significant Labs:  Lab Results   Component Value Date    GROUPTRH A POS 2020    HEPBSAG Negative 2019    STREPBCULT No Group B Streptococcus isolated 2020     Recent Labs   Lab 20  0528   HGB 9.2*   HCT 27.3*       CBC:   Recent Labs   Lab 20  0528   WBC 13.62*   RBC 3.19*   HGB 9.2*   HCT 27.3*      MCV 86   MCH 28.8   MCHC 33.7     I have personallly reviewed all pertinent lab results from the  last 24 hours.    Physical Exam:   Constitutional: She is oriented to person, place, and time. She appears well-developed and well-nourished.       Cardiovascular: Normal rate.     Pulmonary/Chest: Effort normal. No respiratory distress.        Abdominal: Soft. She exhibits no distension and no abdominal incision.     Genitourinary:   Genitourinary Comments: Uterus below the umbilicus               Neurological: She is alert and oriented to person, place, and time.    Skin: Skin is warm and dry.    Psychiatric: She has a normal mood and affect.

## 2020-03-03 NOTE — PLAN OF CARE
Pt. Pain managed with scheduled motrin and prn percocet. Tolerating regular diet.  Enc. Pt. To ambulate and drink fluids. Pt is ambulating w/o difficulty. Passing flatus, had a BM. Pt bp has been elevated with no c/o HA and dizziness. P.O.C reviewed with pt. And s.o. Bonding well with infant.

## 2020-03-03 NOTE — SUBJECTIVE & OBJECTIVE
Hospital course: She was 4 cm on admission and progressed to 8-9 cm in 2 hours  2020: PPD#1 s/p . Patient reports that she is doing well. She is ambulating, voiding and tolerating PO. She reports bleeding is like a period. She is breast feeding and bottle feeding her female infant.     Interval History:   She is doing well this morning. She is tolerating a regular diet without nausea or vomiting. She is voiding spontaneously. She is ambulating. She has passed flatus, and has not a BM. Vaginal bleeding is mild. She denies fever or chills. Abdominal pain is moderate and controlled with oral medications. She is breastfeeding. She desires circumcision for her male baby: not applicable.    Objective:     Vital Signs (Most Recent):  Temp: 96.7 °F (35.9 °C) (20)  Pulse: (!) 54 (20)  Resp: 18 (20)  BP: (!) 154/82 (20)  SpO2: 100 % (20) Vital Signs (24h Range):  Temp:  [96.5 °F (35.8 °C)-98.5 °F (36.9 °C)] 96.7 °F (35.9 °C)  Pulse:  [53-56] 54  Resp:  [16-20] 18  SpO2:  [98 %-100 %] 100 %  BP: (142-154)/(66-85) 154/82     Weight: 66.7 kg (147 lb)  Body mass index is 26.89 kg/m².      Intake/Output Summary (Last 24 hours) at 3/3/2020 1152  Last data filed at 3/2/2020 1719  Gross per 24 hour   Intake 520 ml   Output 400 ml   Net 120 ml       Significant Labs:  Lab Results   Component Value Date    GROUPTRH A POS 2020    HEPBSAG Negative 2019    STREPBCULT No Group B Streptococcus isolated 2020     Recent Labs   Lab 20   HGB 9.2*   HCT 27.3*       CBC:   Recent Labs   Lab 20   WBC 13.62*   RBC 3.19*   HGB 9.2*   HCT 27.3*      MCV 86   MCH 28.8   MCHC 33.7     I have personallly reviewed all pertinent lab results from the last 24 hours.    Physical Exam:   Constitutional: She is oriented to person, place, and time. She appears well-developed and well-nourished.       Cardiovascular: Normal rate.     Pulmonary/Chest:  Effort normal. No respiratory distress.        Abdominal: Soft. She exhibits no distension and no abdominal incision.     Genitourinary:   Genitourinary Comments: Uterus below the umbilicus               Neurological: She is alert and oriented to person, place, and time.    Skin: Skin is warm and dry.    Psychiatric: She has a normal mood and affect.

## 2020-03-03 NOTE — PLAN OF CARE
VSS. NADN. Respirations unlabored. Mom is tolerating her meals. Mommy meal delivered for dinner. Pain managed with scheduled motrin. Patient reported that the percocet 5 received made her nauseous. Mother baby care guide and formula feeding guide given to mom. POC discussed with mom, and she verbalized understanding.

## 2020-03-03 NOTE — PROGRESS NOTES
[facility]  Obstetrics  Postpartum Progress Note    Patient Name: Earnest Islas  MRN: [id number]  Admission Date: 3/1/2020  Hospital Length of Stay: 2 days  Attending Physician: Aletha Pang,*  Primary Care Provider: Dean Jensen MD    Subjective:     Principal Problem: (spontaneous vaginal delivery)    Hospital course: She was 4 cm on admission and progressed to 8-9 cm in 2 hours  2020: PPD#1 s/p . Patient reports that she is doing well. She is ambulating, voiding and tolerating PO. She reports bleeding is like a period. She is breast feeding and bottle feeding her female infant.     Interval History:   She is doing well this morning. She is tolerating a regular diet without nausea or vomiting. She is voiding spontaneously. She is ambulating. She has passed flatus, and has not a BM. Vaginal bleeding is mild. She denies fever or chills. Abdominal pain is moderate and controlled with oral medications. She is breastfeeding. She desires circumcision for her male baby: not applicable.    Objective:     Vital Signs (Most Recent):  Temp: 96.7 °F (35.9 °C) (20 075)  Pulse: (!) 54 (20 075)  Resp: 18 (20 075)  BP: (!) 154/82 (20 075)  SpO2: 100 % (20 075) Vital Signs (24h Range):  Temp:  [96.5 °F (35.8 °C)-98.5 °F (36.9 °C)] 96.7 °F (35.9 °C)  Pulse:  [53-56] 54  Resp:  [16-20] 18  SpO2:  [98 %-100 %] 100 %  BP: (142-154)/(66-85) 154/82     Weight: 66.7 kg (147 lb)  Body mass index is 26.89 kg/m².      Intake/Output Summary (Last 24 hours) at 3/3/2020 1152  Last data filed at 3/2/2020 1719  Gross per 24 hour   Intake 520 ml   Output 400 ml   Net 120 ml       Significant Labs:  Lab Results   Component Value Date    GROUPTRH A POS 2020    HEPBSAG Negative 2019    STREPBCULT No Group B Streptococcus isolated 2020     Recent Labs   Lab 2028   HGB 9.2*   HCT 27.3*       CBC:   Recent Labs   Lab 2028   WBC 13.62*    RBC 3.19*   HGB 9.2*   HCT 27.3*      MCV 86   MCH 28.8   MCHC 33.7     I have personallly reviewed all pertinent lab results from the last 24 hours.    Physical Exam:   Constitutional: She is oriented to person, place, and time. She appears well-developed and well-nourished.       Cardiovascular: Normal rate.     Pulmonary/Chest: Effort normal. No respiratory distress.        Abdominal: Soft. She exhibits no distension and no abdominal incision.     Genitourinary:   Genitourinary Comments: Uterus below the umbilicus               Neurological: She is alert and oriented to person, place, and time.    Skin: Skin is warm and dry.    Psychiatric: She has a normal mood and affect.       Assessment/Plan:     19 y.o. female  for:    *  (spontaneous vaginal delivery)  Postpartum care:  - Patient doing well. Continue routine management and advances.  - Continue PO pain meds. Pain well controlled.  - Encourage ambulation.   - Heme: Pre Delivery h/h 10.5/32 --> Post Delivery h/h 9.2/27  - Contraception - Nexplanon  - Lactation - The patient is Breast and bottle feeding. Lactation nurse following along PRN  - Rh Status - APOS            Gestational hypertension  - BP: (142-154)/(66-85) 154/82  - P/C 0.88 clean catch although asked to obtain as cath specimen because of postpartum bleeding would skew results  - Asymptomatic           Disposition: As patient meets milestones, will plan to discharge PPD#2.    Aletha Pang MD  Obstetrics  [facility] [place]
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
## 2020-03-03 NOTE — DISCHARGE SUMMARY
[facility]  Obstetrics  Discharge Summary      Patient Name: Earnest Islas  MRN: [id number]  Admission Date: 3/1/2020  Hospital Length of Stay: 2 days  Discharge Date and Time:  2020 3:09 PM  Attending Physician: Thomas Pang,*   Discharging Provider: Thomas Pang MD   Primary Care Provider: Dean Jensen MD    HPI: Pt is a 20 yo  @ 39w6d presented to L&D with painful contractions since 11 am          * No surgery found *     Hospital Course:   She was 4 cm on admission and progressed to 8-9 cm in 2 hours  2020: PPD#1 s/p . Patient reports that she is doing well. She is ambulating, voiding and tolerating PO. She reports bleeding is like a period. She is breast feeding and bottle feeding her female infant.   2020: PPD#2 s/p . Patient reports that she is doing well. She is ambulating, voiding and tolerating PO. She reports bleeding is like a period. She is breast feeding and bottle feeding her female infant.        Consults (From admission, onward)        Status Ordering Provider     Consult to Lactation  Use PRN     Provider:  (Not yet assigned)    Acknowledged THOMAS PANG          Final Active Diagnoses:    Diagnosis Date Noted POA    PRINCIPAL PROBLEM:   (spontaneous vaginal delivery) [O80] 2020 Not Applicable    Gestational hypertension [O13.9] 2020 No      Problems Resolved During this Admission:    Diagnosis Date Noted Date Resolved POA    Uterine contractions during pregnancy [O62.2]  2020 Yes    Encounter for induction of labor [Z34.90] 2020 Not Applicable        Labs:   CBC   Recent Labs   Lab 20  0528   WBC 7.33 13.62*   HGB 10.5* 9.2*   HCT 31.9* 27.3*    215       Feeding Method: both breast and bottle    Immunizations     None          Delivery:    Episiotomy: None   Lacerations: 1st   Repair suture:     Repair # of packets: 1   Blood loss (ml):        Birth information:  YOB: 2022   Time of birth: 11:14 PM   Sex: female   Delivery type: Vaginal, Spontaneous   Gestational Age: 39w6d    Delivery Clinician:      Other providers:       Additional  information:  Forceps:    Vacuum:    Breech:    Observed anomalies      Living?:           APGARS  One minute Five minutes Ten minutes   Skin color:         Heart rate:         Grimace:         Muscle tone:         Breathing:         Totals: 8  9        Placenta: Delivered:       appearance    Pending Diagnostic Studies:     None          Discharged Condition: good    Disposition: Home or Self Care    Follow Up:  Follow-up Information     Aletha Pang MD. Go in 1 week.    Specialty:  Obstetrics and Gynecology  Why:  blood pressure check  Contact information:  [address]  [address]  [place] [address]  [phone]             Aletha Pang MD. Go in 6 weeks.    Specialty:  Obstetrics and Gynecology  Why:  post partum  Contact information:  [address] [facility]  [address]  [place] [address]  [phone]                 Patient Instructions:      Diet Adult Regular     Notify your health care provider if you experience any of the following:  temperature >100.4     Notify your health care provider if you experience any of the following:  persistent nausea and vomiting or diarrhea     Notify your health care provider if you experience any of the following:  severe uncontrolled pain     Notify your health care provider if you experience any of the following:  difficulty breathing or increased cough     Notify your health care provider if you experience any of the following:  severe persistent headache     Notify your health care provider if you experience any of the following:  worsening rash     Notify your health care provider if you experience any of the following:  persistent dizziness, light-headedness, or visual disturbances     Notify your health care provider if you experience any  of the following:  increased confusion or weakness     Medications:  Current Discharge Medication List      START taking these medications    Details   ibuprofen (ADVIL,MOTRIN) 800 MG tablet Take 1 tablet (800 mg total) by mouth every 8 (eight) hours as needed for Pain.  Qty: 40 tablet, Refills: 0    Associated Diagnoses:  (spontaneous vaginal delivery)         CONTINUE these medications which have NOT CHANGED    Details   ferrous sulfate 325 (65 FE) MG EC tablet Take 1 tablet (325 mg total) by mouth once daily.  Qty: 90 tablet, Refills: 1    Associated Diagnoses: Anemia during pregnancy in third trimester         STOP taking these medications       buPROPion (WELLBUTRIN XL) 150 MG TB24 tablet Comments:   Reason for Stopping:         prenatal 26-iron ps-folic-dha (VITAFOL-ONE) 29 mg iron- 1 mg-200 mg Cap Comments:   Reason for Stopping:         metoclopramide HCl (REGLAN) 5 MG tablet Comments:   Reason for Stopping:         ondansetron (ZOFRAN-ODT) 4 MG TbDL Comments:   Reason for Stopping:         sertraline (ZOLOFT) 50 MG tablet Comments:   Reason for Stopping:               Aletha Pang MD  Obstetrics  [facility]

## 2020-03-03 NOTE — LACTATION NOTE
Patient wishes to exclusively breastfeed. Reviewed non nursing engorgement and mastitis precautions. Patient verbalizes understanding of all instructions with good recall.

## 2020-03-03 NOTE — PLAN OF CARE
Tolerating regular diet.  Voiding and passing flatus.  Verbalizes pain control with prn pain medications.  Ambulating in room.  Verbalizes knowledge of plan of care.

## 2020-03-03 NOTE — ASSESSMENT & PLAN NOTE
- BP: (142-154)/(66-85) 154/82  - P/C 0.88 clean catch although asked to obtain as cath specimen because of postpartum bleeding would skew results  - Asymptomatic

## 2020-03-03 NOTE — ASSESSMENT & PLAN NOTE
Postpartum care:  - Patient doing well. Continue routine management and advances.  - Continue PO pain meds. Pain well controlled.  - Encourage ambulation.   - Heme: Pre Delivery h/h 10.5/32 --> Post Delivery h/h 9.2/27  - Contraception - Nexplanon  - Lactation - The patient is Breast and bottle feeding. Lactation nurse following along PRN  - Rh Status - APOS

## 2020-03-04 ENCOUNTER — TELEPHONE (OUTPATIENT)
Dept: OBSTETRICS AND GYNECOLOGY | Facility: CLINIC | Age: 20
End: 2020-03-04

## 2020-03-04 ENCOUNTER — NURSE TRIAGE (OUTPATIENT)
Dept: ADMINISTRATIVE | Facility: CLINIC | Age: 20
End: 2020-03-04

## 2020-03-04 NOTE — TELEPHONE ENCOUNTER
"Pt states she has had vaginal delivery on 3/1/20. Pt states OTC ibuprofen and tylenol is not controlling pain. Pt denies taking or being prescribed narcotic pain medication. Pt c/o lower abd pain and vaginal pain. Pt rates pain "over a 10." Pt advised per protocol to ED now and pt verbalizes understanding.     Reason for Disposition   [1] SEVERE abdominal pain AND [2] lasts > 1 hour    Additional Information   Negative: Passed out (i.e., lost consciousness, collapsed and was not responding)   Negative: Shock suspected (e.g., cold/pale/clammy skin, too weak to stand, low BP, rapid pulse)   Negative: Difficult to awaken or acting confused (e.g., disoriented, slurred speech)   Negative: Sounds like a life-threatening emergency to the triager    Protocols used: POSTPARTUM - ABDOMINAL PAIN-A-AH      "

## 2020-03-05 ENCOUNTER — TELEPHONE (OUTPATIENT)
Dept: OBSTETRICS AND GYNECOLOGY | Facility: CLINIC | Age: 20
End: 2020-03-05

## 2020-03-05 NOTE — TELEPHONE ENCOUNTER
LM for pt to return call to office in regards to prescription request being denied.    ----- Message from Pratibha Hull LPN sent at 3/4/2020  2:29 PM CST -----  Contact: Self/  [phone]  Pt. Wants to know why request was denied seen you called pt. To inform her request was denied can you please advise?  ----- Message -----  From: Marybeth Alanis  Sent: 3/4/2020   2:20 PM CST  To: [facility] Staff    Type: Patient Call Back    Who called:  Patient    What is the request in detail:  Patient would like staff to give her a call, she stated she's in a lot of pain and want to know why her refill was denied.  Thank you    Would the patient rather a call back or a response via [facility]?   Call back    Best call back number:   [phone]

## 2020-04-29 ENCOUNTER — TELEPHONE (OUTPATIENT)
Dept: OBSTETRICS AND GYNECOLOGY | Facility: CLINIC | Age: 20
End: 2020-04-29

## 2020-04-29 NOTE — TELEPHONE ENCOUNTER
Attempted to return pt.'s call no answer mailbox full 3 attempts       ----- Message from Dunia Hickman sent at 4/28/2020  4:56 PM CDT -----  Contact: MAHAD HENSON  Name of Who is Calling: MAHAD HENSON      What is the request in detail: Would like to speak to staff in regards to wanting to get back on birth control. Please advise.       Can the clinic reply by [facility]: No      What Number to Call Back if not in [facility]: [phone]

## 2020-05-07 ENCOUNTER — TELEPHONE (OUTPATIENT)
Dept: OBSTETRICS AND GYNECOLOGY | Facility: CLINIC | Age: 20
End: 2020-05-07

## 2020-05-07 NOTE — TELEPHONE ENCOUNTER
Called and LM for pt to call back to schedule an appt to be seen for contraceptive management. kingn    ----- Message from Dipika Fountain sent at 5/7/2020  2:02 PM CDT -----  Contact: Self  Type: Patient Call Back    Who called:Earnest    What is the request in detail:Patient returned call to office concerning birth control    Can the clinic reply by [facility]?    Would the patient rather a call back or a response via [facility]? Call    Best call back number:[phone]    Additional Information:Patient seeking Depo injection

## 2020-05-19 ENCOUNTER — TELEPHONE (OUTPATIENT)
Dept: OBSTETRICS AND GYNECOLOGY | Facility: CLINIC | Age: 20
End: 2020-05-19

## 2020-05-19 NOTE — TELEPHONE ENCOUNTER
----- Message from Awilda Pryor sent at 5/18/2020  3:01 PM CDT -----  Contact: Patient   Type: Patient Call Back    Who called: Patient     What is the request in detail: Pt is requesting a call back in regards to scheduling her DEPO injection.     Can the clinic reply by [facility]?    Would the patient rather a call back or a response via [facility]? Call back     Best call back number: [phone]        Patient has appt sched for 5/20/2020 for post partum and birthcontrol

## 2020-05-20 ENCOUNTER — CLINICAL SUPPORT (OUTPATIENT)
Dept: OBSTETRICS AND GYNECOLOGY | Facility: CLINIC | Age: 20
End: 2020-05-20
Payer: MEDICAID

## 2020-05-20 ENCOUNTER — POSTPARTUM VISIT (OUTPATIENT)
Dept: OBSTETRICS AND GYNECOLOGY | Facility: CLINIC | Age: 20
End: 2020-05-20
Payer: MEDICAID

## 2020-05-20 VITALS
SYSTOLIC BLOOD PRESSURE: 128 MMHG | WEIGHT: 126.88 LBS | HEIGHT: 62 IN | BODY MASS INDEX: 23.35 KG/M2 | DIASTOLIC BLOOD PRESSURE: 77 MMHG

## 2020-05-20 DIAGNOSIS — Z30.013 ENCOUNTER FOR INITIAL PRESCRIPTION OF INJECTABLE CONTRACEPTIVE: ICD-10-CM

## 2020-05-20 DIAGNOSIS — Z30.42 ENCOUNTER FOR MANAGEMENT AND INJECTION OF DEPO-PROVERA: Primary | ICD-10-CM

## 2020-05-20 PROBLEM — Z34.00 SUPERVISION OF NORMAL FIRST PREGNANCY, ANTEPARTUM: Status: RESOLVED | Noted: 2019-08-16 | Resolved: 2020-05-20

## 2020-05-20 PROBLEM — O13.9 GESTATIONAL HYPERTENSION: Status: RESOLVED | Noted: 2020-03-03 | Resolved: 2020-05-20

## 2020-05-20 PROBLEM — O99.013 ANEMIA DURING PREGNANCY IN THIRD TRIMESTER: Status: RESOLVED | Noted: 2020-01-02 | Resolved: 2020-05-20

## 2020-05-20 LAB
B-HCG UR QL: NEGATIVE
CTP QC/QA: YES

## 2020-05-20 PROCEDURE — 96372 THER/PROPH/DIAG INJ SC/IM: CPT | Mod: PBBFAC

## 2020-05-20 PROCEDURE — 59430 PR CARE AFTER DELIVERY ONLY: ICD-10-PCS | Mod: S$PBB,,, | Performed by: OBSTETRICS & GYNECOLOGY

## 2020-05-20 PROCEDURE — 81025 URINE PREGNANCY TEST: CPT | Mod: PBBFAC | Performed by: OBSTETRICS & GYNECOLOGY

## 2020-05-20 PROCEDURE — 99999 PR PBB SHADOW E&M-EST. PATIENT-LVL III: ICD-10-PCS | Mod: PBBFAC,,, | Performed by: OBSTETRICS & GYNECOLOGY

## 2020-05-20 PROCEDURE — 99213 OFFICE O/P EST LOW 20 MIN: CPT | Mod: PBBFAC,25 | Performed by: OBSTETRICS & GYNECOLOGY

## 2020-05-20 PROCEDURE — 99999 PR PBB SHADOW E&M-EST. PATIENT-LVL III: CPT | Mod: PBBFAC,,, | Performed by: OBSTETRICS & GYNECOLOGY

## 2020-05-20 RX ORDER — MEDROXYPROGESTERONE ACETATE 150 MG/ML
150 INJECTION, SUSPENSION INTRAMUSCULAR
Status: COMPLETED | OUTPATIENT
Start: 2020-05-20 | End: 2020-05-20

## 2020-05-20 RX ADMIN — MEDROXYPROGESTERONE ACETATE 150 MG: 150 INJECTION, SUSPENSION INTRAMUSCULAR at 10:05

## 2020-05-20 NOTE — PROGRESS NOTES
History & Physical  Gynecology      SUBJECTIVE:     Chief Complaint: Postpartum Follow-up and Contraception       History of Present Illness:  Earnest Islas is a 20 y.o. female  presents for a postpartum visit and to discuss birth control.  She is status post  11 weeks ago.  Her hospitalization was not complicated.  She is not breastfeeding.  She desires Depo-Provera for contraception.  She denies postpartum depression.    Her last pap was normal 2019.    Review of patient's allergies indicates:  No Known Allergies    Past Medical History:   Diagnosis Date    Anemia     Gestational hypertension 3/3/2020     Past Surgical History:   Procedure Laterality Date    WISDOM TOOTH EXTRACTION       OB History        1    Para   1    Term   1       0    AB   0    Living   1       SAB   0    TAB   0    Ectopic   0    Multiple   0    Live Births   1               Family History   Problem Relation Age of Onset    Breast cancer Maternal Grandmother     Cancer Brother         stomach     Social History     Tobacco Use    Smoking status: Never Smoker    Smokeless tobacco: Never Used    Tobacco comment: pt states she has smoked during this pregnancy but stopped in January of this year   Substance Use Topics    Alcohol use: Not Currently     Comment: 0cc    Drug use: No       Current Outpatient Medications   Medication Sig    ferrous sulfate 325 (65 FE) MG EC tablet Take 1 tablet (325 mg total) by mouth once daily.    ibuprofen (ADVIL,MOTRIN) 800 MG tablet Take 1 tablet (800 mg total) by mouth every 8 (eight) hours as needed for Pain.     No current facility-administered medications for this visit.          Review of Systems:  Review of Systems   Constitutional: Negative for chills and fever.   Eyes: Negative for visual disturbance.   Respiratory: Negative for cough and wheezing.    Cardiovascular: Negative for chest pain and palpitations.   Gastrointestinal: Negative for abdominal  pain, nausea and vomiting.   Genitourinary: Negative for dysuria, frequency, hematuria, pelvic pain, vaginal bleeding, vaginal discharge and vaginal pain.   Neurological: Negative for headaches.   Psychiatric/Behavioral: Negative for depression.        OBJECTIVE:     Physical Exam:  Physical Exam   Constitutional: She is oriented to person, place, and time. She appears well-developed and well-nourished.   Cardiovascular: Normal rate.   Pulmonary/Chest: Effort normal. No respiratory distress.   Abdominal: Soft. She exhibits no distension. There is no tenderness.   Genitourinary: Pelvic exam was performed with patient supine. No vaginal discharge found.   Genitourinary Comments: Vaginal laceration healed well   Neurological: She is oriented to person, place, and time.   Skin: Skin is warm and dry.   Psychiatric: She has a normal mood and affect.   Nursing note and vitals reviewed.    ASSESSMENT:       ICD-10-CM ICD-9-CM    1. Encounter for routine postpartum follow-up Z39.2 V24.2    2. Encounter for initial prescription of injectable contraceptive Z30.013 V25.02 POCT urine pregnancy     Plan:      Earnest was seen today for postpartum follow-up and contraception.    Diagnoses and all orders for this visit:    Encounter for routine postpartum follow-up  - Bottle feeding  - No menses at this time  - Doing well  - Denies postpartum depression, denies SI/HI  - Depo Provera for birth control      Encounter for initial prescription of injectable contraceptive  -     POCT urine pregnancy  - Depo Provera injection today. Discusses possible irregular bleeding for first 3-6 months with injections. Pain, swelling and redness at injection site. Also discussed the possibility of irregular periods with injections or the possibility of amenorrhea. Patient understands that she must return to injection center every 3 months (90 days) for repeat injection.          Orders Placed This Encounter   Procedures    POCT urine pregnancy        Follow up in about 1 year (around 5/20/2021) for WWE.    Counseling time: 30 minutes    Aletha Pang

## 2020-05-20 NOTE — PATIENT INSTRUCTIONS
After a Vaginal Birth  After having a baby, your body may be very tired. It can take time to recover from a vaginal delivery. You may stay in the hospital or birth center from 1 to 4 days. In some cases, you may be able to go home the same day.    Right after the delivery  Your temperature and blood pressure will be taken until they are stable. A nurse or other healthcare provider will observe you as you rest. You may have afterbirth pains. These are cramps caused by the uterus shrinking. Sanitary pads are used to soak up the discharge of the uterine lining. To make sure that you arent bleeding too much, the pad will be checked. And the firmness of your uterus will be checked. To do this, a nurse will gently push down on your stomach. If you had anesthesia, youll be watched closely until you can feel and move your toes. If you have perineal pain (pain between the vagina and anus), an ice pack can help.   care  While still in the hospital or birth center, youll learn how to hold and feed your baby. You will also be given instructions on how to care for your baby. This includes bathing and feeding.   Preparing to go home  You may be anxious to go home as soon as possible. Before you and your baby go home, a healthcare provider will check to be sure you are healthy enough to take care of your baby and yourself. Youre ready to go home when:  · You can walk to the bathroom and use the bathroom without help.  · You can eat solid food and swallow pills (if needed).  · You have no sign of infection or other health problems, including fever.   · You have adequate pain control.  · Your bleeding isn't excessive.  · You are able to care for your  and are emotionally stable.  Before leaving the hospital or birth center, youll be given written instructions for home self-care after vaginal delivery. Be sure to follow these instructions carefully. If you have questions or concerns, talk about them now.  If you  have stitches  You may have received stitches in the skin near your vagina. The stitches might have closed an episiotomy (an incision that enlarges the opening of the vagina). Or you may have needed stitches to repair torn skin. Either way, your stitches should dissolve within weeks. Until then, you can help reduce discomfort, aid healing, and reduce your risk of infection by keeping the stitches clean. These tips can help:  · Gently wipe from front to back after you urinate or have a bowel movement.  · After wiping, spray warm water on the area. Or you can have a sitz bath. This means sitting in a tub with a few inches of water in it. Then pat the area dry or use a hairdryer on a cool setting.  · Do not use soap or any solution except water on the area.  · You can take a shower unless told not to.  · Change sanitary pads at least every 2 to 4 hours.  · Place cold or heat packs on the area as directed by your healthcare providers or nurses. Keep a thin towel between the pack and your skin.  · Sit on firm seats so the stitches pull less.   follow-up  Schedule a  follow-up exam with your healthcare provider for about 6 weeks after delivery. During this exam, your uterus and vaginal area will be checked. Contact your healthcare provider if you think you or your baby are having any problems.  When to call your healthcare provider  Call your health care provider right away if you have:  · A fever of 100.4°F (38.0°C) or higher  · Bleeding that requires a new sanitary pad after an hour, or large blood clots  · Pain in your vagina that gets worse and isn't relieved with medicine  · Swelling, discharge, or increased pain from vaginal tear or episiotomy  · Burning, pain, red streaks, or lumpy areas in your breasts that may be accompanied by flu-like symptoms  · Cracks, blisters, or blood on your nipples  · Burning or pain when you urinate  · Nausea or vomiting  · Dizziness or fainting  · Feelings of extreme  sadness or anxiety, or a feeling that you dont want to be with your baby  · Abdominal pain that isnt relieved with medicine  · Vaginal discharge that has a bad odor  · No bowel movement for 5 days  · Painful urination, orinability to control urination  · Redness, warmth, or pain in the lower leg  · Chest pain   Date Last Reviewed: 8/2/2015  © 4045-0992 [employer]. [address] [place] [address], [place] [address]. All rights reserved. This information is not intended as a substitute for professional medical care. Always follow your healthcare professional's instructions.          Medroxyprogesterone injection [Contraceptive]  What is this medicine?  MEDROXYPROGESTERONE (me DROX ee proe GENO te ayse) contraceptive injections prevent pregnancy. They provide effective birth control for 3 months. Depo-subQ Provera 104 is also used for treating pain related to endometriosis.  How should I use this medicine?  Depo-Provera Contraceptive injection is given into a muscle. Depo-subQ Provera 104 injection is given under the skin. These injections are given by a health care professional. You must not be pregnant before getting an injection. The injection is usually given during the first 5 days after the start of a menstrual period or 6 weeks after delivery of a baby.  Talk to your pediatrician regarding the use of this medicine in children. Special care may be needed. These injections have been used in female children who have started having menstrual periods.  What side effects may I notice from receiving this medicine?  Side effects that you should report to your doctor or health care professional as soon as possible:  · allergic reactions like skin rash, itching or hives, swelling of the face, lips, or tongue  · breast tenderness or discharge  · breathing problems  · changes in vision  · depression  · feeling faint or lightheaded, falls  · fever  · pain in the abdomen, chest, groin, or leg  · problems with balance,  talking, walking  · unusually weak or tired  · yellowing of the eyes or skin  Side effects that usually do not require medical attention (report to your doctor or health care professional if they continue or are bothersome):  · acne  · fluid retention and swelling  · headache  · irregular periods, spotting, or absent periods  · temporary pain, itching, or skin reaction at site where injected  · weight gain  What may interact with this medicine?  Do not take this medicine with any of the following medications:  · bosentan  This medicine may also interact with the following medications:  · aminoglutethimide  · antibiotics or medicines for infections, especially rifampin, rifabutin, rifapentine, and griseofulvin  · aprepitant  · barbiturate medicines such as phenobarbital or primidone  · bexarotene  · carbamazepine  · medicines for seizures like ethotoin, felbamate, oxcarbazepine, phenytoin, topiramate  · modafinil  · Niyah's wort  What if I miss a dose?  Try not to miss a dose. You must get an injection once every 3 months to maintain birth control. If you cannot keep an appointment, call and reschedule it. If you wait longer than 13 weeks between Depo-Provera contraceptive injections or longer than 14 weeks between Depo-subQ Provera 104 injections, you could get pregnant. Use another method for birth control if you miss your appointment. You may also need a pregnancy test before receiving another injection.  Where should I keep my medicine?  This does not apply. The injection will be given to you by a health care professional.  What should I tell my health care provider before I take this medicine?  They need to know if you have any of these conditions:  · frequently drink alcohol  · asthma  · blood vessel disease or a history of a blood clot in the lungs or legs  · bone disease such as osteoporosis  · breast cancer  · diabetes  · eating disorder (anorexia nervosa or bulimia)  · high blood pressure  · HIV  infection or AIDS  · kidney disease  · liver disease  · mental depression  · migraine  · seizures (convulsions)  · stroke  · tobacco smoker  · vaginal bleeding  · an unusual or allergic reaction to medroxyprogesterone, other hormones, medicines, foods, dyes, or preservatives  · pregnant or trying to get pregnant  · breast-feeding  What should I watch for while using this medicine?  This drug does not protect you against HIV infection (AIDS) or other sexually transmitted diseases.  Use of this product may cause you to lose calcium from your bones. Loss of calcium may cause weak bones (osteoporosis). Only use this product for more than 2 years if other forms of birth control are not right for you. The longer you use this product for birth control the more likely you will be at risk for weak bones. Ask your health care professional how you can keep strong bones.  You may have a change in bleeding pattern or irregular periods. Many females stop having periods while taking this drug.  If you have received your injections on time, your chance of being pregnant is very low. If you think you may be pregnant, see your health care professional as soon as possible.  Tell your health care professional if you want to get pregnant within the next year. The effect of this medicine may last a long time after you get your last injection.  NOTE:This sheet is a summary. It may not cover all possible information. If you have questions about this medicine, talk to your doctor, pharmacist, or health care provider. Copyright© 2017 [employer]

## 2020-07-22 ENCOUNTER — TELEPHONE (OUTPATIENT)
Dept: OBSTETRICS AND GYNECOLOGY | Facility: CLINIC | Age: 20
End: 2020-07-22

## 2020-07-22 NOTE — TELEPHONE ENCOUNTER
----- Message from Efrain Weaver sent at 7/22/2020 12:58 PM CDT -----  Regarding: Pt Advice  Contact: MAHAD HENSON [[id number]]  Name of Who is Calling: MAHAD HENSON [[id number]]      What is the request in detail: Would like to speak with staff in regards to other birth control options. States she has been bleeding since she had the depo injection. Please advise      Can the clinic reply by [facility]: no      What Number [facility] Back [facility]: [phone]

## 2020-12-23 ENCOUNTER — PATIENT MESSAGE (OUTPATIENT)
Dept: OBSTETRICS AND GYNECOLOGY | Facility: CLINIC | Age: 20
End: 2020-12-23

## 2021-03-24 ENCOUNTER — OFFICE VISIT (OUTPATIENT)
Dept: OBSTETRICS AND GYNECOLOGY | Facility: CLINIC | Age: 21
End: 2021-03-24
Payer: MEDICAID

## 2021-03-24 VITALS
WEIGHT: 135.25 LBS | HEIGHT: 62 IN | DIASTOLIC BLOOD PRESSURE: 66 MMHG | SYSTOLIC BLOOD PRESSURE: 109 MMHG | BODY MASS INDEX: 24.89 KG/M2

## 2021-03-24 DIAGNOSIS — Z11.3 SCREEN FOR STD (SEXUALLY TRANSMITTED DISEASE): ICD-10-CM

## 2021-03-24 DIAGNOSIS — Z30.017 ENCOUNTER FOR INITIAL PRESCRIPTION OF IMPLANTABLE SUBDERMAL CONTRACEPTIVE: ICD-10-CM

## 2021-03-24 DIAGNOSIS — Z01.419 WELL WOMAN EXAM WITH ROUTINE GYNECOLOGICAL EXAM: Primary | ICD-10-CM

## 2021-03-24 PROCEDURE — 99395 PREV VISIT EST AGE 18-39: CPT | Mod: S$PBB,,, | Performed by: OBSTETRICS & GYNECOLOGY

## 2021-03-24 PROCEDURE — 99213 OFFICE O/P EST LOW 20 MIN: CPT | Mod: PBBFAC | Performed by: OBSTETRICS & GYNECOLOGY

## 2021-03-24 PROCEDURE — 99999 PR PBB SHADOW E&M-EST. PATIENT-LVL III: CPT | Mod: PBBFAC,,, | Performed by: OBSTETRICS & GYNECOLOGY

## 2021-03-24 PROCEDURE — 99999 PR PBB SHADOW E&M-EST. PATIENT-LVL III: ICD-10-PCS | Mod: PBBFAC,,, | Performed by: OBSTETRICS & GYNECOLOGY

## 2021-03-24 PROCEDURE — 99395 PR PREVENTIVE VISIT,EST,18-39: ICD-10-PCS | Mod: S$PBB,,, | Performed by: OBSTETRICS & GYNECOLOGY

## 2021-03-24 PROCEDURE — 87481 CANDIDA DNA AMP PROBE: CPT | Mod: 59 | Performed by: OBSTETRICS & GYNECOLOGY

## 2021-03-24 PROCEDURE — 87591 N.GONORRHOEAE DNA AMP PROB: CPT | Mod: 59 | Performed by: OBSTETRICS & GYNECOLOGY

## 2021-03-24 PROCEDURE — 87491 CHLMYD TRACH DNA AMP PROBE: CPT | Performed by: OBSTETRICS & GYNECOLOGY

## 2021-03-25 LAB
C TRACH DNA SPEC QL NAA+PROBE: NOT DETECTED
N GONORRHOEA DNA SPEC QL NAA+PROBE: NOT DETECTED

## 2021-03-26 DIAGNOSIS — B96.89 BV (BACTERIAL VAGINOSIS): Primary | ICD-10-CM

## 2021-03-26 DIAGNOSIS — N76.0 BV (BACTERIAL VAGINOSIS): Primary | ICD-10-CM

## 2021-03-26 LAB
BACTERIAL VAGINOSIS DNA: POSITIVE
CANDIDA GLABRATA DNA: NEGATIVE
CANDIDA KRUSEI DNA: NEGATIVE
CANDIDA RRNA VAG QL PROBE: NEGATIVE
T VAGINALIS RRNA GENITAL QL PROBE: NEGATIVE

## 2021-03-26 RX ORDER — METRONIDAZOLE 500 MG/1
500 TABLET ORAL EVERY 12 HOURS
Qty: 14 TABLET | Refills: 0 | Status: SHIPPED | OUTPATIENT
Start: 2021-03-26 | End: 2021-04-02

## 2021-04-16 ENCOUNTER — PATIENT MESSAGE (OUTPATIENT)
Dept: RESEARCH | Facility: HOSPITAL | Age: 21
End: 2021-04-16

## 2021-04-28 ENCOUNTER — TELEPHONE (OUTPATIENT)
Dept: OBSTETRICS AND GYNECOLOGY | Facility: CLINIC | Age: 21
End: 2021-04-28

## 2021-06-07 ENCOUNTER — TELEPHONE (OUTPATIENT)
Dept: OBSTETRICS AND GYNECOLOGY | Facility: CLINIC | Age: 21
End: 2021-06-07

## 2022-02-05 ENCOUNTER — HOSPITAL ENCOUNTER (EMERGENCY)
Facility: HOSPITAL | Age: 22
Discharge: HOME | End: 2022-02-05
Attending: EMERGENCY MEDICINE
Payer: MEDICAID

## 2022-02-05 VITALS
HEART RATE: 67 BPM | SYSTOLIC BLOOD PRESSURE: 117 MMHG | TEMPERATURE: 99 F | DIASTOLIC BLOOD PRESSURE: 71 MMHG | BODY MASS INDEX: 23 KG/M2 | RESPIRATION RATE: 16 BRPM | HEIGHT: 62 IN | WEIGHT: 125 LBS | OXYGEN SATURATION: 99 %

## 2022-02-05 DIAGNOSIS — L73.9 FOLLICULITIS: Primary | ICD-10-CM

## 2022-02-05 LAB
B-HCG UR QL: NEGATIVE
CTP QC/QA: YES

## 2022-02-05 PROCEDURE — 87591 N.GONORRHOEAE DNA AMP PROB: CPT | Performed by: NURSE PRACTITIONER

## 2022-02-05 PROCEDURE — 81025 URINE PREGNANCY TEST: CPT | Performed by: NURSE PRACTITIONER

## 2022-02-05 PROCEDURE — 99283 EMERGENCY DEPT VISIT LOW MDM: CPT | Mod: 25

## 2022-02-05 PROCEDURE — 25000003 PHARM REV CODE 250: Performed by: NURSE PRACTITIONER

## 2022-02-05 PROCEDURE — 87491 CHLMYD TRACH DNA AMP PROBE: CPT | Performed by: NURSE PRACTITIONER

## 2022-02-05 RX ORDER — DOXYCYCLINE 100 MG/1
100 CAPSULE ORAL 2 TIMES DAILY
Qty: 20 CAPSULE | Refills: 0 | Status: SHIPPED | OUTPATIENT
Start: 2022-02-05 | End: 2022-02-15

## 2022-02-05 RX ORDER — LIDOCAINE HYDROCHLORIDE 10 MG/ML
5 INJECTION INFILTRATION; PERINEURAL
Status: DISCONTINUED | OUTPATIENT
Start: 2022-02-05 | End: 2022-02-05 | Stop reason: HOSPADM

## 2022-02-05 NOTE — DISCHARGE INSTRUCTIONS
Warm soaks 4- 5 times a day / Sitz baths    Return to ER for fever, worsening symptoms or any other concerns.    Tylenol/Motrin for discomfort

## 2022-02-05 NOTE — ED PROVIDER NOTES
Encounter Date: 2/5/2022    SCRIBE #1 NOTE: IOlga Lidiajonah Lane, am scribing for, and in the presence of, Alissa Gillette NP.       History     Chief Complaint   Patient presents with    Abscess     Patient reports an abscess to her vagina x 4 days. Denies drainage from site, fevers, chills.      21 y.o. female with past medical history of anemia presenting with abscess to right labia for 4 days. Pain is 10/10. She denies drainage or fever. Denies any concern for STD's. She has no known drug allergies.     The history is provided by the patient. No  was used.     Review of patient's allergies indicates:  No Known Allergies  Past Medical History:   Diagnosis Date    Anemia     Gestational hypertension 3/3/2020     Past Surgical History:   Procedure Laterality Date    WISDOM TOOTH EXTRACTION  2018     Family History   Problem Relation Age of Onset    Breast cancer Maternal Grandmother     Cancer Brother         stomach     Social History     Tobacco Use    Smoking status: Never Smoker    Smokeless tobacco: Never Used    Tobacco comment: pt states she has smoked during this pregnancy but stopped in January of this year   Substance Use Topics    Alcohol use: Not Currently     Comment: 0cc    Drug use: No     Review of Systems   Constitutional: Negative for chills and fever.   HENT: Negative for congestion and sore throat.    Eyes: Negative for visual disturbance.   Respiratory: Negative for cough and shortness of breath.    Cardiovascular: Negative for chest pain.   Gastrointestinal: Negative for abdominal pain, nausea and vomiting.   Genitourinary: Negative for dysuria and vaginal discharge.   Skin: Positive for wound (abscess). Negative for rash.   Neurological: Negative for headaches.   Psychiatric/Behavioral: Negative for decreased concentration.       Physical Exam     Initial Vitals [02/05/22 0924]   BP Pulse Resp Temp SpO2   117/71 67 16 98.5 °F (36.9 °C) 99 %      MAP       --          Physical Exam    Nursing note and vitals reviewed.  Constitutional: She appears well-developed and well-nourished.   HENT:   Head: Normocephalic.   Eyes: Conjunctivae are normal.   Neck: Neck supple.   Normal range of motion.  Genitourinary:    Vagina normal.      No vaginal discharge.      Genitourinary Comments: Patient's rate labial fold has pea size area of induration.  There is no fluctuance.  Is tender to palpation.  There is mild surrounding erythema.  Patient has recently shaved     Musculoskeletal:         General: Normal range of motion.      Cervical back: Normal range of motion and neck supple.     Neurological: She is alert and oriented to person, place, and time. GCS score is 15. GCS eye subscore is 4. GCS verbal subscore is 5. GCS motor subscore is 6.   Skin: Skin is warm and dry. Capillary refill takes less than 2 seconds.         ED Course   Procedures  Labs Reviewed   C. TRACHOMATIS/N. GONORRHOEAE BY AMP DNA   POCT URINE PREGNANCY          Imaging Results    None          Medications - No data to display  Medical Decision Making:   Differential Diagnosis:   Bartholin cyst, herpes, STD  ED Management:  Diagnosis management comments: This is an urgent evaluation of a 21-year-old female that presented to the ER with c/o pain to right labia. Pts exam was as above.     Labs  were reviewed and discussed with pt.    Patient's right labia has small indurated area is about dime size.  It is tender palpation.  There is no fluctuance.  This most likely is secondary to patient shaving and getting a small folliculitis/pustule to the area.  I do not believe I and D at this time will produce any drainage therefore we will start with warm soaks, Sitz baths and I will discharge patient on Bactrim.  She has been instructed to return to ER for worsening symptoms, fever or any other concerns.  Patient is aware that she may need to have an I and D if conservative treatment does not resolve the situation.      Based  on exam today - I have low suspicion for medical, surgical or other life threatening condition and I believe pt is safe for discharge and outpatient f/u.    Pt verbalizes understanding of d/c instructions and will return for worsening condition.              Scribe Attestation:   Scribe #1: I performed the above scribed service and the documentation accurately describes the services I performed. I attest to the accuracy of the note.                 Clinical Impression:   Final diagnoses:  [L73.9] Folliculitis (Primary)          ED Disposition Condition    Discharge Stable        ED Prescriptions     Medication Sig Dispense Start Date End Date Auth. Provider    doxycycline (VIBRAMYCIN) 100 MG Cap Take 1 capsule (100 mg total) by mouth 2 (two) times daily. for 10 days 20 capsule 2/5/2022 2/15/2022 Alissa Winkler NP        Follow-up Information     Follow up With Specialties Details Why Contact Info    Dean Jensen MD Obstetrics Schedule an appointment as soon as possible for a visit   [address]  [address]  [facility] [address]  [phone]      [facility] - Emergency Dept Emergency Medicine  If symptoms worsen or any other concerns [address] [facility] [address] [phone]         I, Alissa Winkler NP-C  , personally performed the services described in this documentation. All medical record entries made by the scribe were at my direction and in my presence. I have reviewed the chart and agree that the record reflects my personal performance and is accurate and complete.       Alissa Winkler NP  02/05/22 4110

## 2022-02-08 LAB
C TRACH DNA SPEC QL NAA+PROBE: NOT DETECTED
N GONORRHOEA DNA SPEC QL NAA+PROBE: NOT DETECTED

## 2022-05-18 ENCOUNTER — NURSE TRIAGE (OUTPATIENT)
Dept: ADMINISTRATIVE | Facility: CLINIC | Age: 22
End: 2022-05-18
Payer: MEDICAID

## 2022-05-18 NOTE — TELEPHONE ENCOUNTER
[place]    PCP:  Dr. Dean Jensen    C/O constant sharp pains behind her Lt eye/HA rated 20/10 on pain scale.  This is a recurrent symptom.  Last time was a month ago and symptoms resolved with BC powder.  Pain began ~ 6a.  Denies blurred vision, changes in your vision, discharge from eye, fever, facial rash, and pregnant.  Also reports clear nasal discharge.  LMP:  Currently.  Per protocol, care advised is go to the ED now.  Instructed to call for questions/concerns.  VU.  Unable to route to PCP d/t non-[facility] provider.    Reason for Disposition   Severe eye pain    Protocols used: EYE PAIN-A-[place]

## 2022-06-14 ENCOUNTER — LAB VISIT (OUTPATIENT)
Dept: LAB | Facility: HOSPITAL | Age: 22
End: 2022-06-14
Attending: OBSTETRICS & GYNECOLOGY
Payer: MEDICAID

## 2022-06-14 ENCOUNTER — OFFICE VISIT (OUTPATIENT)
Dept: OBSTETRICS AND GYNECOLOGY | Facility: CLINIC | Age: 22
End: 2022-06-14
Payer: MEDICAID

## 2022-06-14 VITALS
BODY MASS INDEX: 23.4 KG/M2 | SYSTOLIC BLOOD PRESSURE: 123 MMHG | HEIGHT: 62 IN | DIASTOLIC BLOOD PRESSURE: 69 MMHG | WEIGHT: 127.19 LBS

## 2022-06-14 DIAGNOSIS — N89.8 VAGINAL DISCHARGE: ICD-10-CM

## 2022-06-14 DIAGNOSIS — Z98.890 STATUS POST INDUCED ABORTION: ICD-10-CM

## 2022-06-14 DIAGNOSIS — Z01.419 ENCOUNTER FOR GYNECOLOGICAL EXAMINATION WITHOUT ABNORMAL FINDING: Primary | ICD-10-CM

## 2022-06-14 DIAGNOSIS — Z11.3 SCREEN FOR STD (SEXUALLY TRANSMITTED DISEASE): ICD-10-CM

## 2022-06-14 DIAGNOSIS — Z12.39 SCREENING BREAST EXAMINATION: ICD-10-CM

## 2022-06-14 LAB — HCG INTACT+B SERPL-ACNC: 5.7 MIU/ML

## 2022-06-14 PROCEDURE — 3008F BODY MASS INDEX DOCD: CPT | Mod: CPTII,,,

## 2022-06-14 PROCEDURE — 86803 HEPATITIS C AB TEST: CPT

## 2022-06-14 PROCEDURE — 99395 PREV VISIT EST AGE 18-39: CPT | Mod: S$PBB,,,

## 2022-06-14 PROCEDURE — 99999 PR PBB SHADOW E&M-EST. PATIENT-LVL II: CPT | Mod: PBBFAC,,,

## 2022-06-14 PROCEDURE — 87491 CHLMYD TRACH DNA AMP PROBE: CPT | Mod: 59

## 2022-06-14 PROCEDURE — 3008F PR BODY MASS INDEX (BMI) DOCUMENTED: ICD-10-PCS | Mod: CPTII,,,

## 2022-06-14 PROCEDURE — 3078F DIAST BP <80 MM HG: CPT | Mod: CPTII,,,

## 2022-06-14 PROCEDURE — 1159F MED LIST DOCD IN RCRD: CPT | Mod: CPTII,,,

## 2022-06-14 PROCEDURE — 3074F SYST BP LT 130 MM HG: CPT | Mod: CPTII,,,

## 2022-06-14 PROCEDURE — 87340 HEPATITIS B SURFACE AG IA: CPT

## 2022-06-14 PROCEDURE — 88141 CYTOPATH C/V INTERPRET: CPT | Mod: ,,, | Performed by: PATHOLOGY

## 2022-06-14 PROCEDURE — 1159F PR MEDICATION LIST DOCUMENTED IN MEDICAL RECORD: ICD-10-PCS | Mod: CPTII,,,

## 2022-06-14 PROCEDURE — 99395 PR PREVENTIVE VISIT,EST,18-39: ICD-10-PCS | Mod: S$PBB,,,

## 2022-06-14 PROCEDURE — 87389 HIV-1 AG W/HIV-1&-2 AB AG IA: CPT

## 2022-06-14 PROCEDURE — 84702 CHORIONIC GONADOTROPIN TEST: CPT

## 2022-06-14 PROCEDURE — 3074F PR MOST RECENT SYSTOLIC BLOOD PRESSURE < 130 MM HG: ICD-10-PCS | Mod: CPTII,,,

## 2022-06-14 PROCEDURE — 36415 COLL VENOUS BLD VENIPUNCTURE: CPT

## 2022-06-14 PROCEDURE — 88175 CYTOPATH C/V AUTO FLUID REDO: CPT | Performed by: PATHOLOGY

## 2022-06-14 PROCEDURE — 87591 N.GONORRHOEAE DNA AMP PROB: CPT | Mod: 59

## 2022-06-14 PROCEDURE — 88141 PR  CYTOPATH CERV/VAG INTERPRET: ICD-10-PCS | Mod: ,,, | Performed by: PATHOLOGY

## 2022-06-14 PROCEDURE — 87624 HPV HI-RISK TYP POOLED RSLT: CPT

## 2022-06-14 PROCEDURE — 99999 PR PBB SHADOW E&M-EST. PATIENT-LVL II: ICD-10-PCS | Mod: PBBFAC,,,

## 2022-06-14 PROCEDURE — 3078F PR MOST RECENT DIASTOLIC BLOOD PRESSURE < 80 MM HG: ICD-10-PCS | Mod: CPTII,,,

## 2022-06-14 PROCEDURE — 99212 OFFICE O/P EST SF 10 MIN: CPT | Mod: PBBFAC

## 2022-06-14 PROCEDURE — 86592 SYPHILIS TEST NON-TREP QUAL: CPT

## 2022-06-14 PROCEDURE — 87481 CANDIDA DNA AMP PROBE: CPT | Mod: 59

## 2022-06-14 NOTE — PROGRESS NOTES
Subjective:       Patient ID: Earnest Islas is a 22 y.o. female.    Chief Complaint:  Gynecologic Exam      History of Present Illness  HPI  Annual Exam-Premenopausal  Patient presents for annual exam. She is s/p induced  around 2022. She states she past lots of blood and clots and and has now stopped bleeding. She has been having some white, clumpy vaginal discharge with an odor x1 week. Pt would like to start OCP when she ASAP.       The patient is sexually active. GYN screening history: last pap: was normal. The patient wears seatbelts: yes. The patient participates in regular exercise: yes. Has the patient ever been transfused or tattooed?: yes. The patient reports that there is not domestic violence in her life.    She requests STD screening today.     GYN & OB History  Patient's last menstrual period was 2022 (approximate).   Date of Last Pap: 2022    OB History    Para Term  AB Living   2 1 1 0 1 1   SAB IAB Ectopic Multiple Live Births   0 1 0 0 1      # Outcome Date GA Lbr Chad/2nd Weight Sex Delivery Anes PTL Lv   2 IAB 22           1 Term 20 39w6d / 00:37 2.79 kg (6 lb 2.4 oz) F Vag-Spont EPI N SEAN      Obstetric Comments   Pap neg 2019     Past Medical History:  Past Medical History:   Diagnosis Date    Anemia     Gestational hypertension 3/3/2020       Past Surgical History:  Past Surgical History:   Procedure Laterality Date    WISDOM TOOTH EXTRACTION         Family History:  Family History   Problem Relation Age of Onset    Prostate cancer Brother     Breast cancer Maternal Grandmother        Allergies:  Review of patient's allergies indicates:  No Known Allergies    Medications:  No current outpatient medications on file prior to visit.     No current facility-administered medications on file prior to visit.       Social History:  Social History     Tobacco Use    Smoking status: Never Smoker    Smokeless tobacco: Never Used     Tobacco comment: pt states she has smoked during this pregnancy but stopped in January of this year   Substance Use Topics    Alcohol use: Not Currently     Comment: 0cc    Drug use: No              Review of Systems  Review of Systems   Constitutional: Negative for activity change and appetite change.   Respiratory: Negative for shortness of breath.    Cardiovascular: Negative for chest pain.   Gastrointestinal: Negative for abdominal pain, diarrhea and nausea.   Genitourinary: Positive for vaginal discharge (thick white clumpy) and vaginal odor. Negative for bladder incontinence, decreased libido, dysmenorrhea, dyspareunia, dysuria, flank pain, frequency, genital sores, hematuria, hot flashes, menorrhagia, menstrual problem, pelvic pain, urgency, vaginal bleeding, vaginal pain, urinary incontinence, postcoital bleeding, postmenopausal bleeding and vaginal dryness.   Integumentary:  Negative for breast tenderness.   Neurological: Negative for headaches.   Breast: Negative for breast self exam and tenderness          Objective:    Physical Exam:   Constitutional: She is oriented to person, place, and time. She appears well-developed and well-nourished.    HENT:   Head: Normocephalic.      Cardiovascular: Exam reveals no clubbing.     Pulmonary/Chest: Effort normal and breath sounds normal. Right breast exhibits no nipple discharge, no skin change, no tenderness, no bleeding and no swelling. Left breast exhibits no nipple discharge, no skin change, no tenderness, no bleeding and no swelling. Breasts are symmetrical.        Abdominal: Soft. There is no abdominal tenderness. Hernia confirmed negative in the right inguinal area and confirmed negative in the left inguinal area.     Genitourinary:    Inguinal canal, uterus, right adnexa, left adnexa and rectum normal.   Rectum:      No tenderness.      Pelvic exam was performed with patient supine.   The external female genitalia was normal.   No external genitalia  lesions identified,Genitalia hair distrobution normal .   Labial bartholins normal.Cervix is normal. There is vaginal discharge (dark brown ) in the vagina. No tenderness in the vagina.    No signs of injury in the vagina.   Vagina was moist.Cervix exhibits no discharge and no tenderness. Uterus is not tender. Normal urethral meatus.Urethra findings: no tendernessBladder findings: no bladder tenderness          Musculoskeletal: Normal range of motion and moves all extremeties.      Lymphadenopathy: No inguinal adenopathy noted on the right or left side.    Neurological: She is alert and oriented to person, place, and time.    Skin: Skin is warm. Nails show no clubbing.    Psychiatric: She has a normal mood and affect. Her behavior is normal. Judgment and thought content normal.          Assessment:        1. Encounter for gynecological examination without abnormal finding    2. Screening breast examination    3. Status post induced     4. Vaginal discharge    5. Screen for STD (sexually transmitted disease)               Plan:      1. Encounter for gynecological examination without abnormal finding  - Liquid-Based Pap Smear, Screening  - Pap done today.  -   Screening tests as ordered.  - Diet and exercise encouraged.  Condom use encouraged for STD prevention.  Seat belt use encouraged.  Reviewed ASCCP Pap guidelines and screening recommendations.    Counseling: Adequate sleep  Exercise  Health Screens: Health Maintenance reviewed      2. Screening breast examination  - Self breast exams encouraged    3. Status post induced   - HCG, QUANTITATIVE, PREGNANCY; Future    4. Vaginal discharge  - Vaginosis Screen by DNA Probe    5. Screen for STD (sexually transmitted disease)  - C. trachomatis/N. gonorrhoeae by AMP DNA  - Hepatitis B Surface Antigen; Future  - Hepatitis C Antibody; Future  - HIV 1/2 Ag/Ab (4th Gen); Future  - RPR; Future     We will follow HCG levels to <5 before starting OCP.

## 2022-06-15 LAB
HBV SURFACE AG SERPL QL IA: NEGATIVE
HIV 1+2 AB+HIV1 P24 AG SERPL QL IA: NEGATIVE

## 2022-06-16 LAB
HCV AB SERPL QL IA: NEGATIVE
RPR SER QL: NORMAL

## 2022-06-17 ENCOUNTER — PATIENT MESSAGE (OUTPATIENT)
Dept: OBSTETRICS AND GYNECOLOGY | Facility: CLINIC | Age: 22
End: 2022-06-17
Payer: MEDICAID

## 2022-06-17 DIAGNOSIS — B96.89 BV (BACTERIAL VAGINOSIS): Primary | ICD-10-CM

## 2022-06-17 DIAGNOSIS — N76.0 BV (BACTERIAL VAGINOSIS): Primary | ICD-10-CM

## 2022-06-17 LAB
BACTERIAL VAGINOSIS DNA: POSITIVE
C TRACH DNA SPEC QL NAA+PROBE: NOT DETECTED
CANDIDA GLABRATA DNA: NEGATIVE
CANDIDA KRUSEI DNA: NEGATIVE
CANDIDA RRNA VAG QL PROBE: NEGATIVE
N GONORRHOEA DNA SPEC QL NAA+PROBE: NOT DETECTED
T VAGINALIS RRNA GENITAL QL PROBE: NEGATIVE

## 2022-06-17 RX ORDER — METRONIDAZOLE 500 MG/1
500 TABLET ORAL 2 TIMES DAILY
Qty: 14 TABLET | Refills: 0 | Status: SHIPPED | OUTPATIENT
Start: 2022-06-17 | End: 2022-06-24

## 2022-06-22 LAB
FINAL PATHOLOGIC DIAGNOSIS: ABNORMAL
Lab: ABNORMAL

## 2022-06-28 LAB
HPV HR 12 DNA SPEC QL NAA+PROBE: NEGATIVE
HPV16 AG SPEC QL: NEGATIVE
HPV18 DNA SPEC QL NAA+PROBE: NEGATIVE

## 2022-06-30 ENCOUNTER — TELEPHONE (OUTPATIENT)
Dept: OBSTETRICS AND GYNECOLOGY | Facility: CLINIC | Age: 22
End: 2022-06-30
Payer: MEDICAID

## 2022-06-30 NOTE — TELEPHONE ENCOUNTER
Phone number invalid      ----- Message from Elizabeth Rogermark sent at 6/28/2022  1:21 PM CDT -----  Type:  Test Results    Who Called: self    Name of Test (Lab/Mammo/Etc): lab    Date of Test: 6/14    Ordering Provider: EARNEST Sabillon    Where the test was performed: [facility]    Would the patient rather a call back or a response via [facility]? call    Best Call Back Number: [phone]    For Clinical Team:Has the provider reviewed the results?

## 2022-07-01 ENCOUNTER — PATIENT MESSAGE (OUTPATIENT)
Dept: OBSTETRICS AND GYNECOLOGY | Facility: CLINIC | Age: 22
End: 2022-07-01
Payer: MEDICAID

## 2022-07-01 ENCOUNTER — TELEPHONE (OUTPATIENT)
Dept: OBSTETRICS AND GYNECOLOGY | Facility: CLINIC | Age: 22
End: 2022-07-01

## 2022-07-01 DIAGNOSIS — Z98.890 STATUS POST INDUCED ABORTION: Primary | ICD-10-CM

## 2022-07-01 NOTE — TELEPHONE ENCOUNTER
----- Message from Ashley West MA sent at 7/1/2022  3:26 PM CDT -----  Contact: Patient [phone]  This is the one we spoke about. CON  ----- Message -----  From: Ana M Luna  Sent: 7/1/2022   3:22 PM CDT  To: [facility] Staff    Type:  Test Results    Who Called: Patient     Name of Test (Lab/Mammo/Etc): Labs    Date of Test: 06-14-22    Where the test was performed: [facility]    Would the patient rather a call back or a response via [facility]? Call back    Best Call Back Number: [phone]    Additional Information: Patient states that she's been calling for her results and no one's called back. Would like to speak to someone ASAP in regards to this. Also need to speak to nurse in regards to birth control. Please call.     For Clinical Team:Has the provider reviewed the results?

## 2022-08-02 ENCOUNTER — TELEPHONE (OUTPATIENT)
Dept: OBSTETRICS AND GYNECOLOGY | Facility: CLINIC | Age: 22
End: 2022-08-02

## 2022-12-06 ENCOUNTER — TELEPHONE (OUTPATIENT)
Dept: OBSTETRICS AND GYNECOLOGY | Facility: CLINIC | Age: 22
End: 2022-12-06
Payer: MEDICAID

## 2022-12-06 NOTE — TELEPHONE ENCOUNTER
Spoke with patient and scheduled on 12/08 at 1pm with NP-Junior Sabillon. Patient voiced understanding.    ----- Message from Alexandrea Mosher sent at 12/6/2022 12:22 PM CST -----  Regarding: NEW OB  Can the clinic reply in [facility]: YES      NO        Who Called:MAHAD HENSON [[id number]]          Date of Positive Preg Test:12/4        1st day of Last Menstrual Cycle:ending oct        List Any Difficulties:no        What Number to Call Back:[phone]        Preferred Doctor: maurice

## 2022-12-08 ENCOUNTER — OFFICE VISIT (OUTPATIENT)
Dept: OBSTETRICS AND GYNECOLOGY | Facility: CLINIC | Age: 22
End: 2022-12-08
Payer: MEDICAID

## 2022-12-08 VITALS — BODY MASS INDEX: 21.85 KG/M2 | DIASTOLIC BLOOD PRESSURE: 59 MMHG | WEIGHT: 119.5 LBS | SYSTOLIC BLOOD PRESSURE: 108 MMHG

## 2022-12-08 DIAGNOSIS — Z11.3 SCREEN FOR STD (SEXUALLY TRANSMITTED DISEASE): ICD-10-CM

## 2022-12-08 DIAGNOSIS — Z32.01 PREGNANCY CONFIRMED BY POSITIVE URINE TEST: Primary | ICD-10-CM

## 2022-12-08 LAB
BACTERIA #/AREA URNS HPF: ABNORMAL /HPF
BILIRUB UR QL STRIP: NEGATIVE
CLARITY UR: ABNORMAL
COLOR UR: YELLOW
GLUCOSE UR QL STRIP: NEGATIVE
HGB UR QL STRIP: ABNORMAL
KETONES UR QL STRIP: NEGATIVE
LEUKOCYTE ESTERASE UR QL STRIP: NEGATIVE
MICROSCOPIC COMMENT: ABNORMAL
NITRITE UR QL STRIP: POSITIVE
PH UR STRIP: 6 [PH] (ref 5–8)
PROT UR QL STRIP: ABNORMAL
RBC #/AREA URNS HPF: 2 /HPF (ref 0–4)
SP GR UR STRIP: 1.02 (ref 1–1.03)
SQUAMOUS #/AREA URNS HPF: 24 /HPF
URN SPEC COLLECT METH UR: ABNORMAL
UROBILINOGEN UR STRIP-ACNC: NEGATIVE EU/DL
WBC #/AREA URNS HPF: 4 /HPF (ref 0–5)

## 2022-12-08 PROCEDURE — 99212 OFFICE O/P EST SF 10 MIN: CPT | Mod: PBBFAC,TH

## 2022-12-08 PROCEDURE — 87086 URINE CULTURE/COLONY COUNT: CPT

## 2022-12-08 PROCEDURE — 87186 SC STD MICRODIL/AGAR DIL: CPT

## 2022-12-08 PROCEDURE — 87591 N.GONORRHOEAE DNA AMP PROB: CPT

## 2022-12-08 PROCEDURE — 81000 URINALYSIS NONAUTO W/SCOPE: CPT

## 2022-12-08 PROCEDURE — 3008F BODY MASS INDEX DOCD: CPT | Mod: CPTII,,,

## 2022-12-08 PROCEDURE — 3074F PR MOST RECENT SYSTOLIC BLOOD PRESSURE < 130 MM HG: ICD-10-PCS | Mod: CPTII,,,

## 2022-12-08 PROCEDURE — 1159F PR MEDICATION LIST DOCUMENTED IN MEDICAL RECORD: ICD-10-PCS | Mod: CPTII,,,

## 2022-12-08 PROCEDURE — 87491 CHLMYD TRACH DNA AMP PROBE: CPT

## 2022-12-08 PROCEDURE — 3078F PR MOST RECENT DIASTOLIC BLOOD PRESSURE < 80 MM HG: ICD-10-PCS | Mod: CPTII,,,

## 2022-12-08 PROCEDURE — 99203 PR OFFICE/OUTPT VISIT, NEW, LEVL III, 30-44 MIN: ICD-10-PCS | Mod: S$PBB,TH,,

## 2022-12-08 PROCEDURE — 3078F DIAST BP <80 MM HG: CPT | Mod: CPTII,,,

## 2022-12-08 PROCEDURE — 99203 OFFICE O/P NEW LOW 30 MIN: CPT | Mod: S$PBB,TH,,

## 2022-12-08 PROCEDURE — 87088 URINE BACTERIA CULTURE: CPT

## 2022-12-08 PROCEDURE — 87077 CULTURE AEROBIC IDENTIFY: CPT

## 2022-12-08 PROCEDURE — 3074F SYST BP LT 130 MM HG: CPT | Mod: CPTII,,,

## 2022-12-08 PROCEDURE — 3008F PR BODY MASS INDEX (BMI) DOCUMENTED: ICD-10-PCS | Mod: CPTII,,,

## 2022-12-08 PROCEDURE — 1159F MED LIST DOCD IN RCRD: CPT | Mod: CPTII,,,

## 2022-12-08 PROCEDURE — 99999 PR PBB SHADOW E&M-EST. PATIENT-LVL II: CPT | Mod: PBBFAC,,,

## 2022-12-08 PROCEDURE — 99999 PR PBB SHADOW E&M-EST. PATIENT-LVL II: ICD-10-PCS | Mod: PBBFAC,,,

## 2022-12-08 RX ORDER — ASCORBIC ACID, CHOLECALCIFEROL, .ALPHA.-TOCOPHEROL ACETATE, DL-, PYRIDOXINE HYDROCHLORIDE, FOLIC ACID, CYANOCOBALAMIN, BIOTIN, CALCIUM CARBONATE, FERROUS ASPARTO GLYCINATE, IRON, POTASSIUM IODIDE, MAGNESIUM OXIDE, DOCONEXENT AND LOWBUSH BLUEBERRY 60; 1000; 10; 26; 400; 13; 280; 80; 9; 9; 150; 25; 350; 25; 600 MG/1; [IU]/1; [IU]/1; MG/1; UG/1; UG/1; UG/1; MG/1; MG/1; MG/1; UG/1; MG/1; MG/1; MG/1; UG/1
1 CAPSULE, GELATIN COATED ORAL DAILY
Qty: 90 CAPSULE | Refills: 12 | COMMUNITY
Start: 2022-12-08 | End: 2023-05-18 | Stop reason: SDUPTHER

## 2022-12-08 NOTE — PROGRESS NOTES
Subjective:       Patient ID: Earnest Islas is a 22 y.o. female.    Chief Complaint:  Possible Pregnancy      History of Present Illness  HPI  Missed Menses/ Possible Pregnancy  Patient complains of amenorrhea. She believes she could be pregnant. Pregnancy is desired. Sexual Activity: single partner, contraception: none. Current symptoms also include: positive home pregnancy test. Last period was normal.    She is not taking a PNV.  She denies nausea/vomiting. Current symptoms also include: positive home pregnancy test. Denies vaginal bleeding and pelvic pain.    Reports gestational HTN with last pregnancy in - not on any meds. Denies other PMH, abdominal surgeries, medications other than PNV, genetic family history (SC/CF). No personal or family history of DM. No personal or family history of thyroid disease. No h/o HSV. Does not use alcohol, tobacco, or illicit drugs. Feels safe at home.     OB history:   No obstetric history on file.  Prior pregnancies 2 (1 living, 1IAB)  1   Complications: none     Last Pap: 2022 result ASCUS, -HPV        She would like STD screening today.    GYN & OB History  Patient's last menstrual period was 10/25/2022.   Date of Last Pap: 2022    OB History    Para Term  AB Living   2 1 1 0 1 1   SAB IAB Ectopic Multiple Live Births   0 1 0 0 1      # Outcome Date GA Lbr Chad/2nd Weight Sex Delivery Anes PTL Lv   2 IAB 22           1 Term 20 39w6d / 00:37 2.79 kg (6 lb 2.4 oz) F Vag-Spont EPI N SEAN      Obstetric Comments   Pap neg 2019     Past Medical History:  Past Medical History:   Diagnosis Date    Anemia     Gestational hypertension 3/3/2020       Past Surgical History:  Past Surgical History:   Procedure Laterality Date    WISDOM TOOTH EXTRACTION         Family History:  Family History   Problem Relation Age of Onset    Prostate cancer Brother     Breast cancer Maternal Grandmother        Allergies:  Review of patient's allergies  indicates:  No Known Allergies    Medications:  No current outpatient medications on file prior to visit.     No current facility-administered medications on file prior to visit.       Social History:  Social History     Tobacco Use    Smoking status: Never    Smokeless tobacco: Never    Tobacco comments:     pt states she has smoked during this pregnancy but stopped in January of this year   Substance Use Topics    Alcohol use: Not Currently     Comment: 0cc    Drug use: No                Review of Systems  Review of Systems   Constitutional:  Positive for fatigue. Negative for activity change and appetite change.   Respiratory:  Negative for shortness of breath.    Cardiovascular:  Negative for chest pain.   Gastrointestinal:  Negative for abdominal pain, diarrhea, nausea and vomiting.   Genitourinary:  Negative for bladder incontinence, decreased libido, dysmenorrhea, dyspareunia, dysuria, flank pain, frequency, genital sores, hematuria, hot flashes, menorrhagia, menstrual problem, pelvic pain, urgency, vaginal bleeding, vaginal discharge, vaginal pain, urinary incontinence, postcoital bleeding, postmenopausal bleeding, vaginal dryness and vaginal odor.   Integumentary:  Negative for breast tenderness.   Neurological:  Negative for headaches.   Breast: Negative for breast self exam and tenderness        Objective:    Physical Exam       Assessment:        1. Pregnancy confirmed by positive urine test    2. Screen for STD (sexually transmitted disease)            Plan:      1. Pregnancy confirmed by positive urine test  - POCT urine pregnancy  - prenatal vit 87-iron-folic-dha (PRENATE MINI, FERR ASP GLYCIN,) 18-1-350 mg Cap; Take 1 tablet by mouth once daily.  Dispense: 90 capsule; Refill: 12  - Urinalysis  - Urine culture  - US OB/GYN Procedure ([facility]) - Extended List; Future  - C. trachomatis/N. gonorrhoeae by AMP DNA    2. Screen for STD (sexually transmitted disease)  - Hepatitis B Surface Antigen;  Future  - Hepatitis C Antibody; Future  - HIV 1/2 Ag/Ab (4th Gen); Future  - RPR; Future     Follow up In 4 weeks with Dr Elvis Foley for Initial OB  Dating US - they will call you to schedule

## 2022-12-09 LAB
C TRACH DNA SPEC QL NAA+PROBE: NOT DETECTED
N GONORRHOEA DNA SPEC QL NAA+PROBE: NOT DETECTED

## 2022-12-10 LAB — BACTERIA UR CULT: ABNORMAL

## 2022-12-12 DIAGNOSIS — N39.0 URINARY TRACT INFECTION WITHOUT HEMATURIA, SITE UNSPECIFIED: Primary | ICD-10-CM

## 2022-12-12 RX ORDER — CEPHALEXIN 500 MG/1
500 CAPSULE ORAL EVERY 6 HOURS
Qty: 28 CAPSULE | Refills: 0 | Status: SHIPPED | OUTPATIENT
Start: 2022-12-12 | End: 2022-12-19

## 2022-12-21 ENCOUNTER — PATIENT MESSAGE (OUTPATIENT)
Dept: MATERNAL FETAL MEDICINE | Facility: CLINIC | Age: 22
End: 2022-12-21
Payer: MEDICAID

## 2022-12-22 ENCOUNTER — PROCEDURE VISIT (OUTPATIENT)
Dept: MATERNAL FETAL MEDICINE | Facility: CLINIC | Age: 22
End: 2022-12-22
Payer: MEDICAID

## 2022-12-22 DIAGNOSIS — Z32.01 PREGNANCY CONFIRMED BY POSITIVE URINE TEST: ICD-10-CM

## 2022-12-22 DIAGNOSIS — Z36.89 ENCOUNTER FOR FETAL ANATOMIC SURVEY: Primary | ICD-10-CM

## 2022-12-22 PROCEDURE — 76801 OB US < 14 WKS SINGLE FETUS: CPT | Mod: PBBFAC,PO | Performed by: OBSTETRICS & GYNECOLOGY

## 2022-12-22 PROCEDURE — 76801 US OB/GYN EXTENDED PROCEDURE (VIEWPOINT): ICD-10-PCS | Mod: 26,S$PBB,, | Performed by: OBSTETRICS & GYNECOLOGY

## 2023-02-01 ENCOUNTER — PATIENT MESSAGE (OUTPATIENT)
Dept: MATERNAL FETAL MEDICINE | Facility: CLINIC | Age: 23
End: 2023-02-01
Payer: MEDICAID

## 2023-02-09 ENCOUNTER — TELEPHONE (OUTPATIENT)
Dept: MATERNAL FETAL MEDICINE | Facility: CLINIC | Age: 23
End: 2023-02-09
Payer: MEDICAID

## 2023-02-09 NOTE — TELEPHONE ENCOUNTER
Attempted to contact patient. Went straight to message that stated patient did not have voicemail set up.

## 2023-03-03 ENCOUNTER — PATIENT MESSAGE (OUTPATIENT)
Dept: MATERNAL FETAL MEDICINE | Facility: CLINIC | Age: 23
End: 2023-03-03
Payer: MEDICAID

## 2023-03-06 ENCOUNTER — PROCEDURE VISIT (OUTPATIENT)
Dept: MATERNAL FETAL MEDICINE | Facility: CLINIC | Age: 23
End: 2023-03-06
Payer: MEDICAID

## 2023-03-06 DIAGNOSIS — Z36.89 ENCOUNTER FOR FETAL ANATOMIC SURVEY: ICD-10-CM

## 2023-03-06 PROCEDURE — 76805 OB US >/= 14 WKS SNGL FETUS: CPT | Mod: PBBFAC,PO | Performed by: OBSTETRICS & GYNECOLOGY

## 2023-03-06 PROCEDURE — 76805 US MFM PROCEDURE (VIEWPOINT): ICD-10-PCS | Mod: 26,S$PBB,, | Performed by: OBSTETRICS & GYNECOLOGY

## 2023-03-28 ENCOUNTER — TELEPHONE (OUTPATIENT)
Dept: OBSTETRICS AND GYNECOLOGY | Facility: CLINIC | Age: 23
End: 2023-03-28

## 2023-03-28 NOTE — TELEPHONE ENCOUNTER
On call back, pt advised she can purchase pn vitamins otc until her she is seen by provider. Recommended prenatals with iron and folic acid. I stressed the importance of attending next appointment to pt due to her not being seen by a provider since Dec. 2022. Pt voiced understanding.       ----- Message from Madeleine Omer sent at 3/27/2023 11:00 AM CDT -----  .Type: RX Refill Request    Who Called: Self     Have you contacted your pharmacy: No     Refill or New Rx: Refill    RX Name and Strength:prenatal vit 87-iron-folic-dha (PRENATE MINI, FERR ASP GLYCIN,) 18-1-350 mg Cap    Preferred Pharmacy with phone number:.  [id number] - [place] [address] [facility] [address] [place] [address] [address] [facility] [address]  Phone: [phone] Fax: [phone]        Local or Mail Order: Local    Ordering Provider: Taniya    Would the patient rather a call back or a response via [facility]?  Call back    Best Call Back Number: .There are no phone numbers on file.      Additional Information:

## 2023-04-20 ENCOUNTER — INITIAL PRENATAL (OUTPATIENT)
Dept: OBSTETRICS AND GYNECOLOGY | Facility: CLINIC | Age: 23
End: 2023-04-20
Payer: MEDICAID

## 2023-04-20 VITALS
SYSTOLIC BLOOD PRESSURE: 118 MMHG | WEIGHT: 142.63 LBS | BODY MASS INDEX: 26.09 KG/M2 | DIASTOLIC BLOOD PRESSURE: 64 MMHG

## 2023-04-20 DIAGNOSIS — Z91.199 NONCOMPLIANT PREGNANT PATIENT IN SECOND TRIMESTER: ICD-10-CM

## 2023-04-20 DIAGNOSIS — O09.892 NONCOMPLIANT PREGNANT PATIENT IN SECOND TRIMESTER: ICD-10-CM

## 2023-04-20 DIAGNOSIS — Z3A.25 25 WEEKS GESTATION OF PREGNANCY: Primary | ICD-10-CM

## 2023-04-20 PROCEDURE — 99213 OFFICE O/P EST LOW 20 MIN: CPT | Mod: TH,S$PBB,,

## 2023-04-20 PROCEDURE — 99999 PR PBB SHADOW E&M-EST. PATIENT-LVL II: ICD-10-PCS | Mod: PBBFAC,,,

## 2023-04-20 PROCEDURE — 99213 PR OFFICE/OUTPT VISIT, EST, LEVL III, 20-29 MIN: ICD-10-PCS | Mod: TH,S$PBB,,

## 2023-04-20 PROCEDURE — 87591 N.GONORRHOEAE DNA AMP PROB: CPT

## 2023-04-20 PROCEDURE — 87086 URINE CULTURE/COLONY COUNT: CPT

## 2023-04-20 PROCEDURE — 99212 OFFICE O/P EST SF 10 MIN: CPT | Mod: PBBFAC,TH

## 2023-04-20 PROCEDURE — 99999 PR PBB SHADOW E&M-EST. PATIENT-LVL II: CPT | Mod: PBBFAC,,,

## 2023-04-20 NOTE — PROGRESS NOTES
25w2d. Pt reports no complaints. States she missed her appointments due to insurance issues. Instructed on importance of going to lab for blood work today and keeping her OB appointments. Pt does not know if she can go today because she has a hair appointment.     Denies contractions, vaginal bleeding, or leakage of fluid.  Reports adequate fetal movement.  F/U 4 weeks. Next visit: routine care, 28wk labs and Tdap

## 2023-04-22 LAB
BACTERIA UR CULT: NORMAL
C TRACH DNA SPEC QL NAA+PROBE: NOT DETECTED
N GONORRHOEA DNA SPEC QL NAA+PROBE: NOT DETECTED

## 2023-04-25 ENCOUNTER — PATIENT MESSAGE (OUTPATIENT)
Dept: OTHER | Facility: OTHER | Age: 23
End: 2023-04-25

## 2023-05-09 ENCOUNTER — PATIENT MESSAGE (OUTPATIENT)
Dept: OTHER | Facility: OTHER | Age: 23
End: 2023-05-09

## 2023-05-18 ENCOUNTER — CLINICAL SUPPORT (OUTPATIENT)
Dept: OBSTETRICS AND GYNECOLOGY | Facility: CLINIC | Age: 23
End: 2023-05-18
Payer: MEDICAID

## 2023-05-18 ENCOUNTER — ROUTINE PRENATAL (OUTPATIENT)
Dept: OBSTETRICS AND GYNECOLOGY | Facility: CLINIC | Age: 23
End: 2023-05-18
Payer: MEDICAID

## 2023-05-18 VITALS — BODY MASS INDEX: 26.39 KG/M2 | WEIGHT: 144.31 LBS

## 2023-05-18 DIAGNOSIS — Z34.80 SUPERVISION OF OTHER NORMAL PREGNANCY, ANTEPARTUM: Primary | ICD-10-CM

## 2023-05-18 DIAGNOSIS — Z32.01 PREGNANCY CONFIRMED BY POSITIVE URINE TEST: ICD-10-CM

## 2023-05-18 DIAGNOSIS — Z23 NEED FOR TDAP VACCINATION: Primary | ICD-10-CM

## 2023-05-18 PROCEDURE — 99213 OFFICE O/P EST LOW 20 MIN: CPT | Mod: PBBFAC,TH | Performed by: OBSTETRICS & GYNECOLOGY

## 2023-05-18 PROCEDURE — 90471 IMMUNIZATION ADMIN: CPT | Mod: PBBFAC

## 2023-05-18 PROCEDURE — 99999 PR PBB SHADOW E&M-EST. PATIENT-LVL III: CPT | Mod: PBBFAC,,, | Performed by: OBSTETRICS & GYNECOLOGY

## 2023-05-18 PROCEDURE — 99999 PR PBB SHADOW E&M-EST. PATIENT-LVL III: ICD-10-PCS | Mod: PBBFAC,,, | Performed by: OBSTETRICS & GYNECOLOGY

## 2023-05-18 PROCEDURE — 99213 OFFICE O/P EST LOW 20 MIN: CPT | Mod: TH,S$PBB,, | Performed by: OBSTETRICS & GYNECOLOGY

## 2023-05-18 PROCEDURE — 90715 TDAP VACCINE 7 YRS/> IM: CPT | Mod: PBBFAC

## 2023-05-18 PROCEDURE — 99213 PR OFFICE/OUTPT VISIT, EST, LEVL III, 20-29 MIN: ICD-10-PCS | Mod: TH,S$PBB,, | Performed by: OBSTETRICS & GYNECOLOGY

## 2023-05-18 RX ORDER — ASCORBIC ACID, CHOLECALCIFEROL, .ALPHA.-TOCOPHEROL ACETATE, DL-, PYRIDOXINE HYDROCHLORIDE, FOLIC ACID, CYANOCOBALAMIN, BIOTIN, CALCIUM CARBONATE, FERROUS ASPARTO GLYCINATE, IRON, POTASSIUM IODIDE, MAGNESIUM OXIDE, DOCONEXENT AND LOWBUSH BLUEBERRY 60; 1000; 10; 26; 400; 13; 280; 80; 9; 9; 150; 25; 350; 25; 600 MG/1; [IU]/1; [IU]/1; MG/1; UG/1; UG/1; UG/1; MG/1; MG/1; MG/1; UG/1; MG/1; MG/1; MG/1; UG/1
1 CAPSULE, GELATIN COATED ORAL DAILY
Qty: 90 CAPSULE | Refills: 12 | COMMUNITY
Start: 2023-05-18
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
## 2023-05-18 NOTE — TELEPHONE ENCOUNTER
----- Message from Ran Deleon sent at 5/18/2023  2:57 PM CDT -----  Regarding: Self [phone]  Type: Patient Call Back    Who called: Self     What is the request in detail: called stating she just had an appt with the doctor and was told that the doctor was going to send prenatal vitamins to the pharmacy for the pt, pt is at pharmacy and none has been called in. Would like a call to find out when the vitamins will be called in.     Can the clinic reply by [facility]? No     Would the patient rather a call back or a response via [facility]? Call back     Best call back number: [phone]     Additional Information:    Thank you.

## 2023-05-31 ENCOUNTER — LAB VISIT (OUTPATIENT)
Dept: LAB | Facility: HOSPITAL | Age: 23
End: 2023-05-31
Attending: OBSTETRICS & GYNECOLOGY
Payer: MEDICAID

## 2023-05-31 ENCOUNTER — TELEPHONE (OUTPATIENT)
Dept: OBSTETRICS AND GYNECOLOGY | Facility: CLINIC | Age: 23
End: 2023-05-31

## 2023-05-31 ENCOUNTER — NURSE TRIAGE (OUTPATIENT)
Dept: ADMINISTRATIVE | Facility: CLINIC | Age: 23
End: 2023-05-31

## 2023-05-31 ENCOUNTER — ROUTINE PRENATAL (OUTPATIENT)
Dept: OBSTETRICS AND GYNECOLOGY | Facility: CLINIC | Age: 23
End: 2023-05-31
Payer: MEDICAID

## 2023-05-31 VITALS
DIASTOLIC BLOOD PRESSURE: 64 MMHG | BODY MASS INDEX: 26.63 KG/M2 | WEIGHT: 145.63 LBS | SYSTOLIC BLOOD PRESSURE: 110 MMHG

## 2023-05-31 DIAGNOSIS — Z34.80 SUPERVISION OF OTHER NORMAL PREGNANCY, ANTEPARTUM: ICD-10-CM

## 2023-05-31 DIAGNOSIS — Z3A.31 31 WEEKS GESTATION OF PREGNANCY: Primary | ICD-10-CM

## 2023-05-31 LAB — GLUCOSE SERPL-MCNC: 86 MG/DL (ref 70–140)

## 2023-05-31 PROCEDURE — 99213 PR OFFICE/OUTPT VISIT, EST, LEVL III, 20-29 MIN: ICD-10-PCS | Mod: TH,S$PBB,,

## 2023-05-31 PROCEDURE — 99999 PR PBB SHADOW E&M-EST. PATIENT-LVL II: CPT | Mod: PBBFAC,,,

## 2023-05-31 PROCEDURE — 82950 GLUCOSE TEST: CPT | Performed by: OBSTETRICS & GYNECOLOGY

## 2023-05-31 PROCEDURE — 99212 OFFICE O/P EST SF 10 MIN: CPT | Mod: PBBFAC,TH

## 2023-05-31 PROCEDURE — 99213 OFFICE O/P EST LOW 20 MIN: CPT | Mod: TH,S$PBB,,

## 2023-05-31 PROCEDURE — 99999 PR PBB SHADOW E&M-EST. PATIENT-LVL II: ICD-10-PCS | Mod: PBBFAC,,,

## 2023-05-31 PROCEDURE — 36415 COLL VENOUS BLD VENIPUNCTURE: CPT | Performed by: OBSTETRICS & GYNECOLOGY

## 2023-05-31 NOTE — TELEPHONE ENCOUNTER
Prenatal vitamins Rx is not covered because it is available over the counter.  Pt will be notified.  [email or web address]    ----- Message from Darlene Branch sent at 5/31/2023  4:07 PM CDT -----  Type: RX Refill Request    Who Called: pt requesting to get prior authorization. Call pt. Also needs antibiotics for yeast infection. Call pt     Have you contacted your pharmacy:    Refill or New Rx:  prenatal vit 87-iron-folic-dha (PRENATE MINI, FERR ASP GLYCIN,) 18-1-350 mg Cap - needs pa      RX Name and Strength:    How is the patient currently taking it? (ex. 1XDay):    Is this a 30 day or 90 day RX:    Preferred Pharmacy with phone number:  [id number] - [place] - [facility] [address] AT [facility] [address] [address] [address]  [place] [address]  Phone: [phone] Fax: [phone]        Local or Mail Order:    Ordering Provider:    Would the patient rather a call back or a response via [facility]? call    Best Call Back Number:[phone] (home)       Additional Information:

## 2023-05-31 NOTE — TELEPHONE ENCOUNTER
Patient states she is 32 weeks pregnant and saw her dr today. She says she has not been getting her prescriptions. She says a rx for prenatal vitamins was suppose to be sent to the pharmacy on 5/18 and today she was told a rx for some cream for a yeast infection was suppose to be sent but the pharmacy is telling her nothing has been sent in. Not showing anything was sent in per today's progress notes. Attempted to call office, recording states office is closed. She would like the prescriptions sent to [facility] on [address]. Please contact caller directly to discuss further.  Reason for Disposition   [1] Prescription not at pharmacy AND [2] was prescribed by PCP recently (Exception: [facility] has access to EMR and prescription is recorded there. Go to Home Care and confirm for pharmacy.)    Additional Information   Negative: MORE THAN A DOUBLE DOSE of a prescription or over-the-counter (OTC) drug   Negative: [1] DOUBLE DOSE (an extra dose or lesser amount) of prescription drug AND [2] any symptoms (e.g., dizziness, nausea, pain, sleepiness)   Negative: [1] DOUBLE DOSE (an extra dose or lesser amount) of over-the-counter (OTC) drug AND [2] any symptoms (e.g., dizziness, nausea, pain, sleepiness)   Negative: Took another person's prescription drug   Negative: [1] DOUBLE DOSE (an extra dose or lesser amount) of prescription drug AND [2] NO symptoms  (Exception: A double dose of antibiotics.)   Negative: Diabetes drug error or overdose (e.g., took wrong type of insulin or took extra dose)    Protocols used: Medication Question Call-AMARY

## 2023-05-31 NOTE — PROGRESS NOTES
31w6d. Pt reports no complaints.  Denies contractions, vaginal bleeding, or leakage of fluid.  Reports adequate fetal movement.    Instructed on importance of getting labs, glucose screening  F/U 2 weeks. Next visit: routine care    I spent a total of 20 minutes on the day of the visit. This includes face to face time and non-face to face time preparing to see the patient (eg, review of tests and charts), Obtaining and/or reviewing separately obtained history, Documenting clinical information in the electronic or other health record, Independently interpreting results and communicating results to the patient/family/caregiver, or Care coordination.

## 2023-06-01 ENCOUNTER — PATIENT MESSAGE (OUTPATIENT)
Dept: OTHER | Facility: OTHER | Age: 23
End: 2023-06-01
Payer: MEDICAID

## 2023-06-22 ENCOUNTER — PATIENT MESSAGE (OUTPATIENT)
Dept: OTHER | Facility: OTHER | Age: 23
End: 2023-06-22
Payer: MEDICAID

## 2023-06-27 ENCOUNTER — PATIENT MESSAGE (OUTPATIENT)
Dept: RESEARCH | Facility: HOSPITAL | Age: 23
End: 2023-06-27
Payer: MEDICAID

## 2023-06-28 ENCOUNTER — TELEPHONE (OUTPATIENT)
Dept: OBSTETRICS AND GYNECOLOGY | Facility: CLINIC | Age: 23
End: 2023-06-28
Payer: MEDICAID

## 2023-06-28 NOTE — TELEPHONE ENCOUNTER
Spoke with pt and unable to lvm.      ----- Message from Maxine Quinn sent at 6/28/2023 12:18 PM CDT -----  Regarding: Self [phone]  Patient is requesting a sooner appointment.  Patient declined first available appointment listed as well as another facility and provider .  Patient will not accept being placed on the waitlist and is requesting a message be sent to doctor.    Name of Caller:  Self     When is the first available appointment? 7/05    Symptoms: preg check up (35 weeks )     Would the patient rather a call back or a response via [facility]? Call back     Best Call Back Number: .[phone]       Additional Information:

## 2023-07-05 ENCOUNTER — ROUTINE PRENATAL (OUTPATIENT)
Dept: OBSTETRICS AND GYNECOLOGY | Facility: CLINIC | Age: 23
End: 2023-07-05
Payer: MEDICAID

## 2023-07-05 VITALS
DIASTOLIC BLOOD PRESSURE: 80 MMHG | SYSTOLIC BLOOD PRESSURE: 120 MMHG | BODY MASS INDEX: 27.68 KG/M2 | WEIGHT: 151.38 LBS

## 2023-07-05 DIAGNOSIS — Z3A.36 36 WEEKS GESTATION OF PREGNANCY: Primary | ICD-10-CM

## 2023-07-05 PROCEDURE — 99213 OFFICE O/P EST LOW 20 MIN: CPT | Mod: TH,S$PBB,, | Performed by: PHYSICIAN ASSISTANT

## 2023-07-05 PROCEDURE — 99212 OFFICE O/P EST SF 10 MIN: CPT | Mod: PBBFAC | Performed by: PHYSICIAN ASSISTANT

## 2023-07-05 PROCEDURE — 87081 CULTURE SCREEN ONLY: CPT | Performed by: PHYSICIAN ASSISTANT

## 2023-07-05 PROCEDURE — 99999 PR PBB SHADOW E&M-EST. PATIENT-LVL II: ICD-10-PCS | Mod: PBBFAC,,, | Performed by: PHYSICIAN ASSISTANT

## 2023-07-05 PROCEDURE — 99999 PR PBB SHADOW E&M-EST. PATIENT-LVL II: CPT | Mod: PBBFAC,,, | Performed by: PHYSICIAN ASSISTANT

## 2023-07-05 PROCEDURE — 99213 PR OFFICE/OUTPT VISIT, EST, LEVL III, 20-29 MIN: ICD-10-PCS | Mod: TH,S$PBB,, | Performed by: PHYSICIAN ASSISTANT

## 2023-07-05 NOTE — PROGRESS NOTES
Here for routine OB appt at 36w6d, with no complaints.  Reports good FM.  Denies LOF, denies VB, denies contractions.  Reviewed warning signs of Labor and Preeclampsia.  Daily FM counts reinforced.  GBS and T3 today.   F/U scheduled 1 weeks

## 2023-07-07 LAB — BACTERIA SPEC AEROBE CULT: NORMAL

## 2023-07-14 ENCOUNTER — ROUTINE PRENATAL (OUTPATIENT)
Dept: OBSTETRICS AND GYNECOLOGY | Facility: CLINIC | Age: 23
End: 2023-07-14
Payer: MEDICAID

## 2023-07-14 VITALS
SYSTOLIC BLOOD PRESSURE: 136 MMHG | BODY MASS INDEX: 27.72 KG/M2 | WEIGHT: 151.56 LBS | DIASTOLIC BLOOD PRESSURE: 78 MMHG

## 2023-07-14 DIAGNOSIS — Z34.80 SUPERVISION OF OTHER NORMAL PREGNANCY, ANTEPARTUM: Primary | ICD-10-CM

## 2023-07-14 PROCEDURE — 99213 PR OFFICE/OUTPT VISIT, EST, LEVL III, 20-29 MIN: ICD-10-PCS | Mod: TH,S$PBB,, | Performed by: OBSTETRICS & GYNECOLOGY

## 2023-07-14 PROCEDURE — 99213 OFFICE O/P EST LOW 20 MIN: CPT | Mod: TH,S$PBB,, | Performed by: OBSTETRICS & GYNECOLOGY

## 2023-07-14 PROCEDURE — 99212 OFFICE O/P EST SF 10 MIN: CPT | Mod: PBBFAC,TH | Performed by: OBSTETRICS & GYNECOLOGY

## 2023-07-14 PROCEDURE — 99999 PR PBB SHADOW E&M-EST. PATIENT-LVL II: ICD-10-PCS | Mod: PBBFAC,,, | Performed by: OBSTETRICS & GYNECOLOGY

## 2023-07-14 PROCEDURE — 99999 PR PBB SHADOW E&M-EST. PATIENT-LVL II: CPT | Mod: PBBFAC,,, | Performed by: OBSTETRICS & GYNECOLOGY
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
## 2023-07-15 ENCOUNTER — ANESTHESIA (OUTPATIENT)
Dept: OBSTETRICS AND GYNECOLOGY | Facility: HOSPITAL | Age: 23
End: 2023-07-15
Payer: MEDICAID

## 2023-07-15 ENCOUNTER — HOSPITAL ENCOUNTER (INPATIENT)
Facility: HOSPITAL | Age: 23
LOS: 2 days | Discharge: HOME | End: 2023-07-17
Attending: OBSTETRICS & GYNECOLOGY | Admitting: OBSTETRICS & GYNECOLOGY
Payer: MEDICAID

## 2023-07-15 ENCOUNTER — ANESTHESIA EVENT (OUTPATIENT)
Dept: OBSTETRICS AND GYNECOLOGY | Facility: HOSPITAL | Age: 23
End: 2023-07-15
Payer: MEDICAID
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
## 2023-07-15 DIAGNOSIS — R10.9 ABDOMINAL PAIN AFFECTING PREGNANCY: ICD-10-CM

## 2023-07-15 DIAGNOSIS — R10.9 ABDOMINAL CRAMPING: ICD-10-CM

## 2023-07-15 DIAGNOSIS — O26.899 ABDOMINAL PAIN AFFECTING PREGNANCY: ICD-10-CM

## 2023-07-15 DIAGNOSIS — R11.2 NAUSEA & VOMITING: ICD-10-CM

## 2023-07-15 DIAGNOSIS — Z37.9 NORMAL LABOR: ICD-10-CM

## 2023-07-15 LAB
ABO GROUP BLD: NORMAL
BASOPHILS # BLD AUTO: 0.01 K/UL (ref 0–0.2)
BASOPHILS NFR BLD: 0.1 % (ref 0–1.9)
BLD GP AB SCN CELLS X3 SERPL QL: NORMAL
DIFFERENTIAL METHOD: ABNORMAL
EOSINOPHIL # BLD AUTO: 0 K/UL (ref 0–0.5)
EOSINOPHIL NFR BLD: 0.4 % (ref 0–8)
ERYTHROCYTE [DISTWIDTH] IN BLOOD BY AUTOMATED COUNT: 12.4 % (ref 11.5–14.5)
HCT VFR BLD AUTO: 31.1 % (ref 37–48.5)
HGB BLD-MCNC: 10.7 G/DL (ref 12–16)
IMM GRANULOCYTES # BLD AUTO: 0.03 K/UL (ref 0–0.04)
IMM GRANULOCYTES NFR BLD AUTO: 0.4 % (ref 0–0.5)
LYMPHOCYTES # BLD AUTO: 2 K/UL (ref 1–4.8)
LYMPHOCYTES NFR BLD: 27.7 % (ref 18–48)
MCH RBC QN AUTO: 28.1 PG (ref 27–31)
MCHC RBC AUTO-ENTMCNC: 34.4 G/DL (ref 32–36)
MCV RBC AUTO: 82 FL (ref 82–98)
MONOCYTES # BLD AUTO: 0.4 K/UL (ref 0.3–1)
MONOCYTES NFR BLD: 5 % (ref 4–15)
NEUTROPHILS # BLD AUTO: 4.7 K/UL (ref 1.8–7.7)
NEUTROPHILS NFR BLD: 66.4 % (ref 38–73)
NRBC BLD-RTO: 0 /100 WBC
PLATELET # BLD AUTO: 274 K/UL (ref 150–450)
PMV BLD AUTO: 10.7 FL (ref 9.2–12.9)
RBC # BLD AUTO: 3.81 M/UL (ref 4–5.4)
RH BLD: NORMAL
RUPTURE OF MEMBRANE: POSITIVE
WBC # BLD AUTO: 7.05 K/UL (ref 3.9–12.7)

## 2023-07-15 PROCEDURE — 25000003 PHARM REV CODE 250: Performed by: OBSTETRICS & GYNECOLOGY

## 2023-07-15 PROCEDURE — 86850 RBC ANTIBODY SCREEN: CPT | Performed by: OBSTETRICS & GYNECOLOGY

## 2023-07-15 PROCEDURE — C1751 CATH, INF, PER/CENT/MIDLINE: HCPCS | Performed by: ANESTHESIOLOGY

## 2023-07-15 PROCEDURE — 72100002 HC LABOR CARE, 1ST 8 HOURS

## 2023-07-15 PROCEDURE — 59409 PRA ETRICAL CARE,VAG DELIV ONLY: ICD-10-PCS | Mod: AA,,, | Performed by: ANESTHESIOLOGY

## 2023-07-15 PROCEDURE — 51702 INSERT TEMP BLADDER CATH: CPT

## 2023-07-15 PROCEDURE — 99222 PR INITIAL HOSPITAL CARE,LEVL II: ICD-10-PCS | Mod: ,,, | Performed by: OBSTETRICS & GYNECOLOGY

## 2023-07-15 PROCEDURE — 63600175 PHARM REV CODE 636 W HCPCS

## 2023-07-15 PROCEDURE — 85025 COMPLETE CBC W/AUTO DIFF WBC: CPT | Performed by: OBSTETRICS & GYNECOLOGY

## 2023-07-15 PROCEDURE — 72200006 HC VAGINAL DELIVERY LEVEL III

## 2023-07-15 PROCEDURE — 86900 BLOOD TYPING SEROLOGIC ABO: CPT | Performed by: OBSTETRICS & GYNECOLOGY

## 2023-07-15 PROCEDURE — 84112 EVAL AMNIOTIC FLUID PROTEIN: CPT | Performed by: OBSTETRICS & GYNECOLOGY

## 2023-07-15 PROCEDURE — 99222 1ST HOSP IP/OBS MODERATE 55: CPT | Mod: ,,, | Performed by: OBSTETRICS & GYNECOLOGY

## 2023-07-15 PROCEDURE — 11000001 HC ACUTE MED/SURG PRIVATE ROOM

## 2023-07-15 PROCEDURE — 99211 OFF/OP EST MAY X REQ PHY/QHP: CPT

## 2023-07-15 PROCEDURE — 27200710 HC EPIDURAL INFUSION PUMP SET: Performed by: ANESTHESIOLOGY

## 2023-07-15 PROCEDURE — 59025 FETAL NON-STRESS TEST: CPT

## 2023-07-15 PROCEDURE — 59409 OBSTETRICAL CARE: CPT | Mod: AA,,, | Performed by: ANESTHESIOLOGY

## 2023-07-15 PROCEDURE — 63600175 PHARM REV CODE 636 W HCPCS: Performed by: OBSTETRICS & GYNECOLOGY

## 2023-07-15 PROCEDURE — 62326 NJX INTERLAMINAR LMBR/SAC: CPT | Performed by: ANESTHESIOLOGY

## 2023-07-15 PROCEDURE — 63600175 PHARM REV CODE 636 W HCPCS: Performed by: ANESTHESIOLOGY

## 2023-07-15 PROCEDURE — 25000003 PHARM REV CODE 250: Performed by: ANESTHESIOLOGY

## 2023-07-15 PROCEDURE — 86592 SYPHILIS TEST NON-TREP QUAL: CPT | Performed by: OBSTETRICS & GYNECOLOGY

## 2023-07-15 PROCEDURE — 86762 RUBELLA ANTIBODY: CPT | Performed by: OBSTETRICS & GYNECOLOGY

## 2023-07-15 RX ORDER — HYDROCORTISONE 25 MG/G
CREAM TOPICAL 3 TIMES DAILY PRN
Status: DISCONTINUED | OUTPATIENT
Start: 2023-07-15 | End: 2023-07-17 | Stop reason: HOSPADM

## 2023-07-15 RX ORDER — HYDROCODONE BITARTRATE AND ACETAMINOPHEN 10; 325 MG/1; MG/1
1 TABLET ORAL EVERY 4 HOURS PRN
Status: DISCONTINUED | OUTPATIENT
Start: 2023-07-15 | End: 2023-07-17 | Stop reason: HOSPADM

## 2023-07-15 RX ORDER — IBUPROFEN 600 MG/1
600 TABLET ORAL EVERY 6 HOURS
Status: DISCONTINUED | OUTPATIENT
Start: 2023-07-15 | End: 2023-07-17 | Stop reason: HOSPADM

## 2023-07-15 RX ORDER — MISOPROSTOL 200 UG/1
800 TABLET ORAL ONCE AS NEEDED
Status: DISCONTINUED | OUTPATIENT
Start: 2023-07-15 | End: 2023-07-15

## 2023-07-15 RX ORDER — PROCHLORPERAZINE EDISYLATE 5 MG/ML
5 INJECTION INTRAMUSCULAR; INTRAVENOUS EVERY 6 HOURS PRN
Status: DISCONTINUED | OUTPATIENT
Start: 2023-07-15 | End: 2023-07-17 | Stop reason: HOSPADM

## 2023-07-15 RX ORDER — TRANEXAMIC ACID 10 MG/ML
1000 INJECTION, SOLUTION INTRAVENOUS ONCE AS NEEDED
Status: DISCONTINUED | OUTPATIENT
Start: 2023-07-15 | End: 2023-07-17 | Stop reason: HOSPADM

## 2023-07-15 RX ORDER — SODIUM CHLORIDE 0.9 % (FLUSH) 0.9 %
10 SYRINGE (ML) INJECTION
Status: DISCONTINUED | OUTPATIENT
Start: 2023-07-15 | End: 2023-07-17 | Stop reason: HOSPADM

## 2023-07-15 RX ORDER — DIPHENHYDRAMINE HYDROCHLORIDE 50 MG/ML
25 INJECTION INTRAMUSCULAR; INTRAVENOUS EVERY 4 HOURS PRN
Status: DISCONTINUED | OUTPATIENT
Start: 2023-07-15 | End: 2023-07-17 | Stop reason: HOSPADM

## 2023-07-15 RX ORDER — TERBUTALINE SULFATE 1 MG/ML
0.25 INJECTION SUBCUTANEOUS EVERY 30 MIN PRN
Status: DISCONTINUED | OUTPATIENT
Start: 2023-07-15 | End: 2023-07-17 | Stop reason: HOSPADM

## 2023-07-15 RX ORDER — SIMETHICONE 80 MG
1 TABLET,CHEWABLE ORAL 4 TIMES DAILY PRN
Status: DISCONTINUED | OUTPATIENT
Start: 2023-07-15 | End: 2023-07-15

## 2023-07-15 RX ORDER — TERBUTALINE SULFATE 1 MG/ML
INJECTION SUBCUTANEOUS
Status: COMPLETED
Start: 2023-07-15 | End: 2023-07-15

## 2023-07-15 RX ORDER — TRANEXAMIC ACID 10 MG/ML
1000 INJECTION, SOLUTION INTRAVENOUS ONCE AS NEEDED
Status: DISCONTINUED | OUTPATIENT
Start: 2023-07-15 | End: 2023-07-15

## 2023-07-15 RX ORDER — HYDROCODONE BITARTRATE AND ACETAMINOPHEN 5; 325 MG/1; MG/1
1 TABLET ORAL EVERY 4 HOURS PRN
Status: DISCONTINUED | OUTPATIENT
Start: 2023-07-15 | End: 2023-07-17 | Stop reason: HOSPADM

## 2023-07-15 RX ORDER — DIPHENHYDRAMINE HCL 25 MG
25 CAPSULE ORAL EVERY 4 HOURS PRN
Status: DISCONTINUED | OUTPATIENT
Start: 2023-07-15 | End: 2023-07-17 | Stop reason: HOSPADM

## 2023-07-15 RX ORDER — OXYTOCIN/RINGER'S LACTATE 30/500 ML
95 PLASTIC BAG, INJECTION (ML) INTRAVENOUS ONCE AS NEEDED
Status: DISCONTINUED | OUTPATIENT
Start: 2023-07-15 | End: 2023-07-15

## 2023-07-15 RX ORDER — MUPIROCIN 20 MG/G
OINTMENT TOPICAL
Status: CANCELLED | OUTPATIENT
Start: 2023-07-15

## 2023-07-15 RX ORDER — SIMETHICONE 80 MG
1 TABLET,CHEWABLE ORAL EVERY 6 HOURS PRN
Status: DISCONTINUED | OUTPATIENT
Start: 2023-07-15 | End: 2023-07-17 | Stop reason: HOSPADM

## 2023-07-15 RX ORDER — FENTANYL/BUPIVACAINE/NS/PF 2MCG/ML-.1
PLASTIC BAG, INJECTION (ML) INJECTION CONTINUOUS PRN
Status: DISCONTINUED | OUTPATIENT
Start: 2023-07-15 | End: 2023-07-15

## 2023-07-15 RX ORDER — BUPIVACAINE HYDROCHLORIDE 2.5 MG/ML
INJECTION, SOLUTION EPIDURAL; INFILTRATION; INTRACAUDAL
Status: DISCONTINUED | OUTPATIENT
Start: 2023-07-15 | End: 2023-07-15

## 2023-07-15 RX ORDER — ACETAMINOPHEN 325 MG/1
650 TABLET ORAL EVERY 6 HOURS PRN
Status: DISCONTINUED | OUTPATIENT
Start: 2023-07-15 | End: 2023-07-17 | Stop reason: HOSPADM

## 2023-07-15 RX ORDER — OXYTOCIN/RINGER'S LACTATE 30/500 ML
0-30 PLASTIC BAG, INJECTION (ML) INTRAVENOUS CONTINUOUS
Status: DISCONTINUED | OUTPATIENT
Start: 2023-07-15 | End: 2023-07-17 | Stop reason: HOSPADM

## 2023-07-15 RX ORDER — OXYTOCIN/RINGER'S LACTATE 30/500 ML
334 PLASTIC BAG, INJECTION (ML) INTRAVENOUS ONCE
Status: DISCONTINUED | OUTPATIENT
Start: 2023-07-15 | End: 2023-07-15

## 2023-07-15 RX ORDER — OXYTOCIN 10 [USP'U]/ML
10 INJECTION, SOLUTION INTRAMUSCULAR; INTRAVENOUS ONCE AS NEEDED
Status: DISCONTINUED | OUTPATIENT
Start: 2023-07-15 | End: 2023-07-17 | Stop reason: HOSPADM

## 2023-07-15 RX ORDER — METHYLERGONOVINE MALEATE 0.2 MG/ML
200 INJECTION INTRAVENOUS
Status: DISCONTINUED | OUTPATIENT
Start: 2023-07-15 | End: 2023-07-15

## 2023-07-15 RX ORDER — OXYTOCIN/RINGER'S LACTATE 30/500 ML
95 PLASTIC BAG, INJECTION (ML) INTRAVENOUS ONCE
Status: DISCONTINUED | OUTPATIENT
Start: 2023-07-15 | End: 2023-07-17 | Stop reason: HOSPADM

## 2023-07-15 RX ORDER — OXYTOCIN/RINGER'S LACTATE 30/500 ML
334 PLASTIC BAG, INJECTION (ML) INTRAVENOUS ONCE AS NEEDED
Status: DISCONTINUED | OUTPATIENT
Start: 2023-07-15 | End: 2023-07-15

## 2023-07-15 RX ORDER — LIDOCAINE HYDROCHLORIDE 10 MG/ML
10 INJECTION INFILTRATION; PERINEURAL ONCE AS NEEDED
Status: DISCONTINUED | OUTPATIENT
Start: 2023-07-15 | End: 2023-07-15

## 2023-07-15 RX ORDER — OXYTOCIN 10 [USP'U]/ML
10 INJECTION, SOLUTION INTRAMUSCULAR; INTRAVENOUS ONCE AS NEEDED
Status: DISCONTINUED | OUTPATIENT
Start: 2023-07-15 | End: 2023-07-15

## 2023-07-15 RX ORDER — MISOPROSTOL 200 UG/1
800 TABLET ORAL
Status: CANCELLED | OUTPATIENT
Start: 2023-07-15

## 2023-07-15 RX ORDER — CALCIUM CARBONATE 200(500)MG
500 TABLET,CHEWABLE ORAL 3 TIMES DAILY PRN
Status: DISCONTINUED | OUTPATIENT
Start: 2023-07-15 | End: 2023-07-15

## 2023-07-15 RX ORDER — CARBOPROST TROMETHAMINE 250 UG/ML
250 INJECTION, SOLUTION INTRAMUSCULAR
Status: DISCONTINUED | OUTPATIENT
Start: 2023-07-15 | End: 2023-07-15

## 2023-07-15 RX ORDER — ONDANSETRON 8 MG/1
8 TABLET, ORALLY DISINTEGRATING ORAL EVERY 8 HOURS PRN
Status: DISCONTINUED | OUTPATIENT
Start: 2023-07-15 | End: 2023-07-17 | Stop reason: HOSPADM

## 2023-07-15 RX ORDER — METHYLERGONOVINE MALEATE 0.2 MG/ML
200 INJECTION INTRAVENOUS
Status: DISCONTINUED | OUTPATIENT
Start: 2023-07-15 | End: 2023-07-17 | Stop reason: HOSPADM

## 2023-07-15 RX ORDER — OXYTOCIN/RINGER'S LACTATE 30/500 ML
95 PLASTIC BAG, INJECTION (ML) INTRAVENOUS ONCE AS NEEDED
Status: COMPLETED | OUTPATIENT
Start: 2023-07-15 | End: 2023-07-15

## 2023-07-15 RX ORDER — DOCUSATE SODIUM 100 MG/1
200 CAPSULE, LIQUID FILLED ORAL 2 TIMES DAILY PRN
Status: DISCONTINUED | OUTPATIENT
Start: 2023-07-15 | End: 2023-07-17 | Stop reason: HOSPADM

## 2023-07-15 RX ORDER — PRENATAL WITH FERROUS FUM AND FOLIC ACID 3080; 920; 120; 400; 22; 1.84; 3; 20; 10; 1; 12; 200; 27; 25; 2 [IU]/1; [IU]/1; MG/1; [IU]/1; MG/1; MG/1; MG/1; MG/1; MG/1; MG/1; UG/1; MG/1; MG/1; MG/1; MG/1
1 TABLET ORAL DAILY
Status: DISCONTINUED | OUTPATIENT
Start: 2023-07-16 | End: 2023-07-17 | Stop reason: HOSPADM

## 2023-07-15 RX ORDER — OXYTOCIN/RINGER'S LACTATE 30/500 ML
95 PLASTIC BAG, INJECTION (ML) INTRAVENOUS ONCE
Status: DISCONTINUED | OUTPATIENT
Start: 2023-07-15 | End: 2023-07-15

## 2023-07-15 RX ORDER — OXYTOCIN/RINGER'S LACTATE 30/500 ML
334 PLASTIC BAG, INJECTION (ML) INTRAVENOUS ONCE AS NEEDED
Status: COMPLETED | OUTPATIENT
Start: 2023-07-15 | End: 2023-07-15

## 2023-07-15 RX ORDER — MISOPROSTOL 200 UG/1
800 TABLET ORAL ONCE AS NEEDED
Status: DISCONTINUED | OUTPATIENT
Start: 2023-07-15 | End: 2023-07-17 | Stop reason: HOSPADM

## 2023-07-15 RX ORDER — CARBOPROST TROMETHAMINE 250 UG/ML
250 INJECTION, SOLUTION INTRAMUSCULAR
Status: DISCONTINUED | OUTPATIENT
Start: 2023-07-15 | End: 2023-07-17 | Stop reason: HOSPADM

## 2023-07-15 RX ORDER — SODIUM CHLORIDE, SODIUM LACTATE, POTASSIUM CHLORIDE, CALCIUM CHLORIDE 600; 310; 30; 20 MG/100ML; MG/100ML; MG/100ML; MG/100ML
INJECTION, SOLUTION INTRAVENOUS CONTINUOUS
Status: DISCONTINUED | OUTPATIENT
Start: 2023-07-15 | End: 2023-07-15

## 2023-07-15 RX ADMIN — HYDROCODONE BITARTRATE AND ACETAMINOPHEN 1 TABLET: 5; 325 TABLET ORAL at 09:07

## 2023-07-15 RX ADMIN — BUPIVACAINE HYDROCHLORIDE 3 ML: 2.5 INJECTION, SOLUTION EPIDURAL; INFILTRATION; INTRACAUDAL; PERINEURAL at 12:07

## 2023-07-15 RX ADMIN — TERBUTALINE SULFATE 0.25 MG: 1 INJECTION SUBCUTANEOUS at 01:07

## 2023-07-15 RX ADMIN — Medication 10 ML/HR: at 12:07

## 2023-07-15 RX ADMIN — Medication 95 MILLI-UNITS/MIN: at 04:07

## 2023-07-15 RX ADMIN — Medication 999 MILLI-UNITS/MIN: at 04:07

## 2023-07-15 RX ADMIN — Medication 2 MILLI-UNITS/MIN: at 11:07

## 2023-07-15 RX ADMIN — TERBUTALINE SULFATE 0.25 MG: 1 INJECTION, SOLUTION SUBCUTANEOUS at 01:07

## 2023-07-15 RX ADMIN — ACETAMINOPHEN 650 MG: 325 TABLET ORAL at 05:07

## 2023-07-15 RX ADMIN — SODIUM CHLORIDE, POTASSIUM CHLORIDE, SODIUM LACTATE AND CALCIUM CHLORIDE: 600; 310; 30; 20 INJECTION, SOLUTION INTRAVENOUS at 10:07

## 2023-07-15 NOTE — ANESTHESIA PROCEDURE NOTES
Epidural    Patient location during procedure: OB   Reason for block: primary anesthetic   Reason for block: labor analgesia requested by patient and obstetrician  Diagnosis: IUP   Start time: 7/15/2023 12:12 PM  Timeout: 7/15/2023 12:11 PM  End time: 7/15/2023 12:20 PM    Staffing  Performing Provider: Kevon Moyer MD  Authorizing Provider: Kevon Moyer MD        Preanesthetic Checklist  Completed: patient identified, IV checked, site marked, risks and benefits discussed, surgical consent, monitors and equipment checked, pre-op evaluation, timeout performed, anesthesia consent given, hand hygiene performed and patient being monitored  Preparation  Patient position: sitting  Prep: ChloraPrep  Patient monitoring: ECG, Blood Pressure, continuous capnometry and Pulse Ox  Reason for block: primary anesthetic   Epidural  Skin Anesthetic: lidocaine 1%  Skin Wheal: 3 mL  Administration type: continuous  Approach: midline  Interspace: L4-5    Injection technique: BIANKA saline  Needle and Epidural Catheter  Needle type: Tuohy   Needle gauge: 17  Needle length: 3.5 inches  Needle insertion depth: 4 cm  Catheter type: end hole and springwound  Catheter size: 19 G  Catheter at skin depth: 9 cm  Insertion Attempts: 1  Test dose: 3 mL of lidocaine 1.5% with Epi 1-to-200,000  Additional Documentation: incremental injection, negative aspiration for heme and CSF, no paresthesia on injection, no signs/symptoms of IV or SA injection, no significant pain on injection and no significant complaints from patient  Needle localization: anatomical landmarks  Assessment  Ease of block: easy  Patient's tolerance of the procedure: comfortable throughout block and no complaints No inadvertent dural puncture with Tuohy.  Dural puncture not performed with spinal needle

## 2023-07-15 NOTE — HPI
Earnest Islas is a 23 y.o.  female with IUP at 38w2d weeks gestation who is admitted for an induction of labor secondary to rupture of membranes, labor.     This IUP is uncomplicated.  Patient reports contractions, denies vaginal bleeding, reports LOF.   Fetal Movement: normal.

## 2023-07-15 NOTE — ASSESSMENT & PLAN NOTE
- Admit to Labor & Delivery  - Plans for expectant management  - Place IV and start IVFs  - Type & Screen and CBC ordered STAT  - Continuous fetal monitoring  - Notify anesthesia of patient arrival  - Postpartum Hemorrhage risk: Medium

## 2023-07-15 NOTE — H&P
[facility] [place]  Obstetrics  History & Physical    Patient Name: Earnest Islas  MRN: [id number]  Admission Date: 7/15/2023  Primary Care Provider: Dean Jensen MD    Subjective:     Principal Problem:Normal labor    History of Present Illness:   Earnest Islas is a 23 y.o.  female with IUP at 38w2d weeks gestation who is admitted for an induction of labor secondary to rupture of membranes, labor.     This IUP is uncomplicated.  Patient reports contractions, denies vaginal bleeding, reports LOF.   Fetal Movement: normal.           OB History    Para Term  AB Living   3 1 1 0 1 1   SAB IAB Ectopic Multiple Live Births   0 1 0 0 1      # Outcome Date GA Lbr Chad/2nd Weight Sex Delivery Anes PTL Lv   3 Current            2 IAB 22              Birth Comments: DO NOT MENTION IN FRONT OF ANYONE   1 Term 20 39w6d / 00:37 2.79 kg (6 lb 2.4 oz) F Vag-Spont EPI N SEAN      Name: SIXTO,GIRL EARNEST      Apgar1: 8  Apgar5: 9      Obstetric Comments   Pap neg 2019     Past Medical History:   Diagnosis Date    Anemia     Gestational hypertension 3/3/2020     Past Surgical History:   Procedure Laterality Date    WISDOM TOOTH EXTRACTION         PTA Medications   Medication Sig    acyclovir 5% (ZOVIRAX) 5 % Crea Apply topically 5 (five) times daily. (Patient not taking: Reported on 2023)    oxycodone-acetaminophen (PERCOCET) 5-325 mg per tablet Take 1 tablet by mouth every 4 (four) hours as needed for Pain. (Patient not taking: Reported on 2023)    prenatal vit 87-iron-folic-dha (PRENATE MINI, FERR ASP GLYCIN,) 18-1-350 mg Cap Take 1 tablet by mouth once daily.       Review of patient's allergies indicates:  No Known Allergies     Family History       Problem Relation (Age of Onset)    Breast cancer Maternal Grandmother    Prostate cancer Brother          Tobacco Use    Smoking status: Never    Smokeless tobacco: Never    Tobacco comments:     pt states  she has smoked during this pregnancy but stopped in January of this year   Substance and Sexual Activity    Alcohol use: Not Currently     Comment: 0cc    Drug use: No    Sexual activity: Yes     Birth control/protection: None     Review of Systems   Constitutional:  Negative for chills and fever.   Eyes:  Negative for visual disturbance.   Respiratory:  Negative for cough and wheezing.    Cardiovascular:  Negative for chest pain and palpitations.   Gastrointestinal:  Negative for abdominal pain, nausea and vomiting.   Genitourinary:  Positive for vaginal discharge. Negative for dysuria, frequency, hematuria, pelvic pain, vaginal bleeding and vaginal pain.        LOF   Neurological:  Negative for headaches.   Psychiatric/Behavioral:  Negative for depression.     Objective:     Vital Signs (Most Recent):  Temp: 99.7 °F (37.6 °C) (07/15/23 1550)  Pulse: 81 (07/15/23 1559)  BP: 129/60 (07/15/23 1500)  SpO2: (!) 84 % (07/15/23 1503) Vital Signs (24h Range):  Temp:  [99.7 °F (37.6 °C)] 99.7 °F (37.6 °C)  Pulse:  [51-98] 81  SpO2:  [84 %-91 %] 84 %  BP: (112-152)/(60-91) 129/60        There is no height or weight on file to calculate BMI.    FHT: Cat 1 (reassuring)  TOCO:  Q 3-5 minutes     Physical Exam:   Constitutional: She is oriented to person, place, and time. She appears well-developed and well-nourished.       Cardiovascular:  Normal rate.             Pulmonary/Chest: Effort normal. No respiratory distress.        Abdominal: Soft. She exhibits no distension and no abdominal incision.                 Neurological: She is alert and oriented to person, place, and time.    Skin: Skin is warm and dry.    Psychiatric: She has a normal mood and affect.      Cervix:  Dilation:  3  Effacement:  75%  Station: -2  Presentation: Vertex     Significant Labs:  Lab Results   Component Value Date    GROUPTRH A POS 03/01/2020    HEPBSAG Negative 06/14/2022    STREPBCULT No Group B Streptococcus isolated 07/05/2023       CBC:    Recent Labs   Lab 07/15/23  1044   WBC 7.05   RBC 3.81*   HGB 10.7*   HCT 31.1*      MCV 82   MCH 28.1   MCHC 34.4     I have personallly reviewed all pertinent lab results from the last 24 hours.    Assessment/Plan:     23 y.o. female  at 38w2d for:    * Normal labor  - Admit to Labor & Delivery  - Plans for expectant management  - Place IV and start IVFs  - Type & Screen and CBC ordered STAT  - Continuous fetal monitoring  - Notify anesthesia of patient arrival  - Postpartum Hemorrhage risk: Medium      Aletha Pierce MD  Obstetrics  [facility] - [place] - Labor & Delivery

## 2023-07-15 NOTE — NURSING
Patient History  GA: 38w2d   GP:    Estimated Date of Delivery: 23   Antepartum complications:   Past Medical History:   Diagnosis Date    Anemia     Gestational hypertension 3/3/2020       OB History    Para Term  AB Living   3 1 1 0 1 1   SAB IAB Ectopic Multiple Live Births   0 1 0   1      # Outcome Date GA Lbr Chad/2nd Weight Sex Delivery Anes PTL Lv   3 Current            2 IAB 22              Birth Comments: DO NOT MENTION IN FRONT OF ANYONE   1 Term 20 39w6d / 00:37 2.79 kg (6 lb 2.4 oz) F Vag-Spont EPI N SEAN      Obstetric Comments   Pap neg 2019      VS: Pulse:  [64-71] 64  BP: (133)/(86) 133/86   Complaints: pt presented with c/o water breaking around 8 am this morning along with some cramping, ROM+ sent SVE done.       Number of past c-sections: 0  History of past pregnancy complications:   Pain level on admit:   Pain reassessment:   Fetal movement: present  Uterine Activity   Contraction intensity:occ  Contraction Frequency (minutes):   Contractions per 10 minutes:  Contraction Duration (seconds):   Uterine tone: soft  FHR baseline:   Variability:   Accels:   Decels:   SVE  Dilation: 3  Effacement:60  Station: -2  Name of second nurse verification of strip:
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
## 2023-07-15 NOTE — ANESTHESIA POSTPROCEDURE EVALUATION
Anesthesia Post Evaluation    Patient: Earnest Islas    Procedure(s) Performed: * No procedures listed *    Final Anesthesia Type: epidural      Patient location during evaluation: labor & delivery  Patient participation: Yes- Able to Participate  Level of consciousness: awake and alert and oriented  Post-procedure vital signs: reviewed and stable  Pain management: adequate  Airway patency: patent    PONV status at discharge: No PONV  Anesthetic complications: no      Cardiovascular status: blood pressure returned to baseline and hemodynamically stable  Respiratory status: unassisted, room air and spontaneous ventilation  Hydration status: euvolemic  Follow-up not needed.          Vitals Value Taken Time   /66 07/15/23 1705   Temp 37.9 °C (100.2 °F) 07/15/23 1703   Pulse 90 07/15/23 1708   Resp  07/15/23 1710   SpO2 91 % 07/15/23 1708   Vitals shown include unvalidated device data.      No case tracking events are documented in the log.      Pain/Jose Score: Pain Rating Prior to Med Admin: 8 (7/15/2023  5:07 PM)

## 2023-07-15 NOTE — PROGRESS NOTES
07/15/23 1252   [facility] Note - Strip   Strip Reviewed by [facility] Nurse? Yes   [facility] Note - Communication   [place] Nurse Communicated with Bedside Nurse Regarding: Fetal Status   [facility] Note - Notification   Nurse Notified? Yes   Name of Nurse Kelby at desk

## 2023-07-15 NOTE — L&D DELIVERY NOTE
[facility] - [place] - Labor & Delivery  Vaginal Delivery   Operative Note    SUMMARY     Normal spontaneous vaginal delivery of live infant, was placed on mothers abdomen for skin to skin and bulb suctioning performed.  Infant delivered position JULIO over intact perineum.  Nuchal cord: No.    Spontaneous delivery of placenta and IV pitocin given noting good uterine tone.  No lacerations noted.  Patient tolerated delivery well. Sponge needle and lap counted correctly x2.    This delivery was complicated by a shoulder dystocia    After the head delivered, the right shoulder was noted to be anterior and held behind the pubic symphysis.  Immediately at the identification of dystocia, I asked the nurse to note the time and call assistance to the room including additional residents, nursing staff and peditrics   There was enough room for manuevers and an episiotomy was not required.  Maneuvers Required to relieve the dystocia included:        Melodie and Suprapubic pressure  Pediatricians/ NICU were not in room at delivery  APGARS were  7 at 1 minute and 9 at 5 minutes  Birthweight No birth weight on file.   was moving both arms without evidence of nerve injury at the time of delivery  There was not evidence of clavicular fracture  The Shoulder dystocia lasted a total of 10 seconds        Indications:  (spontaneous vaginal delivery)  Pregnancy complicated by:   Patient Active Problem List   Diagnosis     (spontaneous vaginal delivery)     Admitting GA: 38w2d    Delivery Information for Ricky Islas    Birth information:  YOB: 2025   Time of birth: 4:18 PM   Sex: male   Head Delivery Date/Time: 7/15/2023  4:18 PM   Delivery type: Vaginal, Spontaneous   Gestational Age: 38w2d        Delivery Providers    Delivering clinician: Aletha Pierce MD   Provider Role    GREY Dunne RN Jo Lincks, RN Springer Tammi Stephanie McDermott, RN Shelley M.  KATTY Solorio               Measurements    Weight: 3030 g  Weight (lbs): 6 lb 10.9 oz  Length:          Apgars    Living status: Living  Apgars:  1 min.:  5 min.:  10 min.:  15 min.:  20 min.:    Skin color:  1  1       Heart rate:  2  2       Reflex irritability:  1  2       Muscle tone:  1  2       Respiratory effort:  2  2       Total:  7  9       Apgars assigned by: NNP         Operative Delivery    Forceps attempted?: No  Vacuum extractor attempted?: No         Shoulder Dystocia    Shoulder dystocia present?: Yes  Anterior shoulder: right  Time recognized: 7/15/2023 16:18:00  Time help called: 7/15/2023 16:18:00     Physician/Provider arrived: 7/15/2023 16:12:00     NICU arrived: 7/15/2023 16:18:00     Gentle attempt at traction, assisted by maternal expulsive forces?: Yes   First maneuver: Melodie maneuver, suprapubic pressure  First maneuver performed at: 7/15/2023 16:18:00  First maneuver performed by: Dr. Pierce           Presentation    Presentation: Vertex           Interventions/Resuscitation    Method: Bulb Suctioning, Tactile Stimulation       Cord    Vessels: 3 vessels  Complications: None  Delayed Cord Clamping?: No  Cord Clamped Date/Time: 7/15/2023  4:18 PM  Cord Blood Disposition: Sent with Baby  Gases Sent?: No  Stem Cell Collection (by MD): No       Placenta    Placenta delivery date/time: 7/15/2023 1621  Placenta removal: Spontaneous  Placenta appearance: Intact  Placenta disposition: Discarded           Labor Events:       labor: No     Labor Onset Date/Time:         Dilation Complete Date/Time:         Start Pushing Date/Time:         Start Pushing Date/Time:       Rupture Date/Time: 07/15/23  0800         Rupture type: SRM (Spontaneous Rupture)         Fluid Amount:       Fluid Color: Clear               steroids:       Antibiotics given for GBS: No     Induction:       Indications for induction:        Augmentation: oxytocin     Indications for augmentation:        Labor complications: None     Additional complications:          Cervical ripening:                     Delivery:      Episiotomy: None     Indication for Episiotomy:       Perineal Lacerations: None Repaired:      Periurethral Laceration:   Repaired:     Labial Laceration:   Repaired:     Sulcus Laceration:   Repaired:     Vaginal Laceration:   Repaired:     Cervical Laceration:   Repaired:     Repair suture: None     Repair # of packets:       Last Value - EBL - Nursing (mL):       Sum - EBL - Nursing (mL): 0     Last Value - EBL - Anesthesia (mL):      Calculated QBL (mL): 250      Vaginal Sweep Performed: Yes     Surgicount Correct: Yes     Vaginal Packing: No Quantity:       Other providers:       Anesthesia    Method: Epidural          Details (if applicable):  Trial of Labor      Categorization:      Priority:     Indications for :     Incision Type:       Additional  information:  Forceps:    Vacuum:    Breech:    Observed anomalies    Other (Comments):

## 2023-07-15 NOTE — SUBJECTIVE & OBJECTIVE
OB History    Para Term  AB Living   3 1 1 0 1 1   SAB IAB Ectopic Multiple Live Births   0 1 0 0 1      # Outcome Date GA Lbr Chad/2nd Weight Sex Delivery Anes PTL Lv   3 Current            2 IAB 22              Birth Comments: DO NOT MENTION IN FRONT OF ANYONE   1 Term 20 39w6d / 00:37 2.79 kg (6 lb 2.4 oz) F Vag-Spont EPI N SEAN      Name: SIXTO,BO TOBIN      Apgar1: 8  Apgar5: 9      Obstetric Comments   Pap neg 2019     Past Medical History:   Diagnosis Date    Anemia     Gestational hypertension 3/3/2020     Past Surgical History:   Procedure Laterality Date    WISDOM TOOTH EXTRACTION         PTA Medications   Medication Sig    acyclovir 5% (ZOVIRAX) 5 % Crea Apply topically 5 (five) times daily. (Patient not taking: Reported on 2023)    oxycodone-acetaminophen (PERCOCET) 5-325 mg per tablet Take 1 tablet by mouth every 4 (four) hours as needed for Pain. (Patient not taking: Reported on 2023)    prenatal vit 87-iron-folic-dha (PRENATE MINI, FERR ASP GLYCIN,) 18-1-350 mg Cap Take 1 tablet by mouth once daily.       Review of patient's allergies indicates:  No Known Allergies     Family History       Problem Relation (Age of Onset)    Breast cancer Maternal Grandmother    Prostate cancer Brother          Tobacco Use    Smoking status: Never    Smokeless tobacco: Never    Tobacco comments:     pt states she has smoked during this pregnancy but stopped in January of this year   Substance and Sexual Activity    Alcohol use: Not Currently     Comment: 0cc    Drug use: No    Sexual activity: Yes     Birth control/protection: None     Review of Systems   Constitutional:  Negative for chills and fever.   Eyes:  Negative for visual disturbance.   Respiratory:  Negative for cough and wheezing.    Cardiovascular:  Negative for chest pain and palpitations.   Gastrointestinal:  Negative for abdominal pain, nausea and vomiting.   Genitourinary:  Positive for vaginal discharge.  Negative for dysuria, frequency, hematuria, pelvic pain, vaginal bleeding and vaginal pain.        LOF   Neurological:  Negative for headaches.   Psychiatric/Behavioral:  Negative for depression.     Objective:     Vital Signs (Most Recent):  Temp: 99.7 °F (37.6 °C) (07/15/23 1550)  Pulse: 81 (07/15/23 1559)  BP: 129/60 (07/15/23 1500)  SpO2: (!) 84 % (07/15/23 1503) Vital Signs (24h Range):  Temp:  [99.7 °F (37.6 °C)] 99.7 °F (37.6 °C)  Pulse:  [51-98] 81  SpO2:  [84 %-91 %] 84 %  BP: (112-152)/(60-91) 129/60        There is no height or weight on file to calculate BMI.    FHT: Cat 1 (reassuring)  TOCO:  Q 3-5 minutes     Physical Exam:   Constitutional: She is oriented to person, place, and time. She appears well-developed and well-nourished.       Cardiovascular:  Normal rate.             Pulmonary/Chest: Effort normal. No respiratory distress.        Abdominal: Soft. She exhibits no distension and no abdominal incision.                 Neurological: She is alert and oriented to person, place, and time.    Skin: Skin is warm and dry.    Psychiatric: She has a normal mood and affect.      Cervix:  Dilation:  3  Effacement:  75%  Station: -2  Presentation: Vertex     Significant Labs:  Lab Results   Component Value Date    GROUPTRH A POS 03/01/2020    HEPBSAG Negative 06/14/2022    STREPBCULT No Group B Streptococcus isolated 07/05/2023       CBC:   Recent Labs   Lab 07/15/23  1044   WBC 7.05   RBC 3.81*   HGB 10.7*   HCT 31.1*      MCV 82   MCH 28.1   MCHC 34.4     I have personallly reviewed all pertinent lab results from the last 24 hours.

## 2023-07-16 PROBLEM — R11.2 NAUSEA & VOMITING: Status: RESOLVED | Noted: 2019-12-28 | Resolved: 2023-07-16

## 2023-07-16 PROBLEM — R10.9 ABDOMINAL PAIN AFFECTING PREGNANCY: Status: RESOLVED | Noted: 2020-02-26 | Resolved: 2023-07-16

## 2023-07-16 PROBLEM — O26.899 ABDOMINAL PAIN AFFECTING PREGNANCY: Status: RESOLVED | Noted: 2020-02-26 | Resolved: 2023-07-16
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
## 2023-07-16 LAB
BASOPHILS # BLD AUTO: 0.02 K/UL (ref 0–0.2)
BASOPHILS NFR BLD: 0.1 % (ref 0–1.9)
DIFFERENTIAL METHOD: ABNORMAL
EOSINOPHIL # BLD AUTO: 0 K/UL (ref 0–0.5)
EOSINOPHIL NFR BLD: 0.1 % (ref 0–8)
ERYTHROCYTE [DISTWIDTH] IN BLOOD BY AUTOMATED COUNT: 12.4 % (ref 11.5–14.5)
HCT VFR BLD AUTO: 26.9 % (ref 37–48.5)
HGB BLD-MCNC: 9.1 G/DL (ref 12–16)
IMM GRANULOCYTES # BLD AUTO: 0.06 K/UL (ref 0–0.04)
IMM GRANULOCYTES NFR BLD AUTO: 0.4 % (ref 0–0.5)
LYMPHOCYTES # BLD AUTO: 2.5 K/UL (ref 1–4.8)
LYMPHOCYTES NFR BLD: 17.8 % (ref 18–48)
MCH RBC QN AUTO: 28.2 PG (ref 27–31)
MCHC RBC AUTO-ENTMCNC: 33.8 G/DL (ref 32–36)
MCV RBC AUTO: 83 FL (ref 82–98)
MONOCYTES # BLD AUTO: 0.7 K/UL (ref 0.3–1)
MONOCYTES NFR BLD: 4.7 % (ref 4–15)
NEUTROPHILS # BLD AUTO: 10.8 K/UL (ref 1.8–7.7)
NEUTROPHILS NFR BLD: 76.9 % (ref 38–73)
NRBC BLD-RTO: 0 /100 WBC
PLATELET # BLD AUTO: 234 K/UL (ref 150–450)
PMV BLD AUTO: 10.8 FL (ref 9.2–12.9)
RBC # BLD AUTO: 3.23 M/UL (ref 4–5.4)
WBC # BLD AUTO: 14.11 K/UL (ref 3.9–12.7)

## 2023-07-16 PROCEDURE — 11000001 HC ACUTE MED/SURG PRIVATE ROOM

## 2023-07-16 PROCEDURE — 36415 COLL VENOUS BLD VENIPUNCTURE: CPT | Performed by: OBSTETRICS & GYNECOLOGY

## 2023-07-16 PROCEDURE — 85025 COMPLETE CBC W/AUTO DIFF WBC: CPT | Performed by: OBSTETRICS & GYNECOLOGY

## 2023-07-16 PROCEDURE — 25000003 PHARM REV CODE 250: Performed by: OBSTETRICS & GYNECOLOGY

## 2023-07-16 RX ADMIN — IBUPROFEN 600 MG: 600 TABLET ORAL at 05:07

## 2023-07-16 RX ADMIN — PRENATAL VIT W/ FE FUMARATE-FA TAB 27-0.8 MG 1 TABLET: 27-0.8 TAB at 08:07

## 2023-07-16 RX ADMIN — IBUPROFEN 600 MG: 600 TABLET ORAL at 12:07

## 2023-07-16 RX ADMIN — HYDROCODONE BITARTRATE AND ACETAMINOPHEN 1 TABLET: 10; 325 TABLET ORAL at 04:07

## 2023-07-16 RX ADMIN — HYDROCODONE BITARTRATE AND ACETAMINOPHEN 1 TABLET: 5; 325 TABLET ORAL at 02:07

## 2023-07-16 RX ADMIN — IBUPROFEN 600 MG: 600 TABLET ORAL at 11:07

## 2023-07-16 RX ADMIN — HYDROCODONE BITARTRATE AND ACETAMINOPHEN 1 TABLET: 10; 325 TABLET ORAL at 08:07

## 2023-07-16 RX ADMIN — IBUPROFEN 600 MG: 600 TABLET ORAL at 06:07

## 2023-07-16 NOTE — PLAN OF CARE
VSS. Afebrile  Ambulating without difficulty.   Tolerating regular diet without any GI symptoms voiced.  Voiding without difficulty. Passing flatus.   Pain controlled with PRN and scheduled meds.  Appropriate maternal bonding noted.   Plan of care reviewed with patient. All questions answered.

## 2023-07-16 NOTE — SUBJECTIVE & OBJECTIVE
Interval History:   Status post  7/15/23.    She is doing well this morning. She is tolerating a regular diet without nausea or vomiting. She is voiding spontaneously. She is ambulating. She has passed flatus, and has a BM. Vaginal bleeding is mild. She denies fever or chills. Abdominal pain is mild and controlled with oral medications. She Is not breastfeeding. She desires circumcision for her male baby: yes.    Objective:     Vital Signs (Most Recent):  Temp: 97.6 °F (36.4 °C) (23)  Pulse: (!) 47 (23)  Resp: 18 (23)  BP: 138/68 (23)  SpO2: 98 % (23) Vital Signs (24h Range):  Temp:  [97.6 °F (36.4 °C)-100.2 °F (37.9 °C)] 97.6 °F (36.4 °C)  Pulse:  [] 47  Resp:  [18-20] 18  SpO2:  [84 %-99 %] 98 %  BP: (112-152)/(55-91) 138/68     Weight: 68.8 kg (151 lb 10.8 oz)  Body mass index is 27.74 kg/m².      Intake/Output Summary (Last 24 hours) at 2023 1021  Last data filed at 7/15/2023 1714  Gross per 24 hour   Intake 2810.63 ml   Output 250 ml   Net 2560.63 ml         Significant Labs:  Lab Results   Component Value Date    GROUPTRH A POS 2020    HEPBSAG Negative 2022    STREPBCULT No Group B Streptococcus isolated 2023     Recent Labs   Lab 23  0554   HGB 9.1*   HCT 26.9*       CBC:   Recent Labs   Lab 23  0554   WBC 14.11*   RBC 3.23*   HGB 9.1*   HCT 26.9*      MCV 83   MCH 28.2   MCHC 33.8     I have personallly reviewed all pertinent lab results from the last 24 hours.    Physical Exam:   Constitutional: She appears well-developed and well-nourished. No distress.    HENT:   Head: Normocephalic and atraumatic.    Eyes: EOM are normal.     Cardiovascular:  Normal rate.             Pulmonary/Chest: Effort normal. No respiratory distress.        Abdominal: Soft. She exhibits no distension. There is no abdominal tenderness. There is no rebound and no guarding.             Musculoskeletal: Normal range of motion.        Neurological: She is alert.    Skin: Skin is warm and dry.    Psychiatric: She has a normal mood and affect.     Review of Systems

## 2023-07-16 NOTE — PLAN OF CARE
Problem: Adult Inpatient Plan of Care  Goal: Plan of Care Review  Outcome: Ongoing, Progressing  Flowsheets (Taken 2023 0534)  Plan of Care Reviewed With: patient     Problem: Adult Inpatient Plan of Care  Goal: Patient-Specific Goal (Individualized)  Flowsheets (Taken 2023 0534)  Anxieties, Fears or Concerns: For healthy baby and safe formula feeding  Individualized Care Needs: Pain control     Problem: Adult Inpatient Plan of Care  Goal: Absence of Hospital-Acquired Illness or Injury  Outcome: Ongoing, Progressing     Problem:  Fall Injury Risk  Goal: Absence of Fall, Infant Drop and Related Injury  Outcome: Ongoing, Progressing     Problem: Infection  Goal: Absence of Infection Signs and Symptoms  Outcome: Ongoing, Progressing     Problem: Bleeding (Postpartum Vaginal Delivery)  Goal: Hemostasis  Outcome: Ongoing, Progressing     Problem: Pain (Postpartum Vaginal Delivery)  Goal: Acceptable Pain Control  Outcome: Ongoing, Progressing

## 2023-07-16 NOTE — HOSPITAL COURSE
7/15 1618 M 6#10.9 3030 2023 Doing well.  Formula-feeding.  2023: PPD#2 s/p . Patient reports that she is ambulating, voiding, and tolerating PO. Lochia is mild and pain is controlled. She would like circumcision for her male infant.

## 2023-07-16 NOTE — PROGRESS NOTES
[facility]  Obstetrics  Postpartum Progress Note    Patient Name: Earnest Islas  MRN: [id number]  Admission Date: 7/15/2023  Hospital Length of Stay: 1 days  Attending Physician: Aletha Pierce MD  Primary Care Provider: Dean Jensen MD    Subjective:     Principal Problem: (spontaneous vaginal delivery)    Hospital Course:   7/15 1618 M 6#10.9 3030 2023 Doing well.  Formula-feeding.        Interval History:   Status post  7/15/23.    She is doing well this morning. She is tolerating a regular diet without nausea or vomiting. She is voiding spontaneously. She is ambulating. She has passed flatus, and has a BM. Vaginal bleeding is mild. She denies fever or chills. Abdominal pain is mild and controlled with oral medications. She Is not breastfeeding. She desires circumcision for her male baby: yes.    Objective:     Vital Signs (Most Recent):  Temp: 97.6 °F (36.4 °C) (23)  Pulse: (!) 47 (23)  Resp: 18 (23 0853)  BP: 138/68 (23)  SpO2: 98 % (23) Vital Signs (24h Range):  Temp:  [97.6 °F (36.4 °C)-100.2 °F (37.9 °C)] 97.6 °F (36.4 °C)  Pulse:  [] 47  Resp:  [18-20] 18  SpO2:  [84 %-99 %] 98 %  BP: (112-152)/(55-91) 138/68     Weight: 68.8 kg (151 lb 10.8 oz)  Body mass index is 27.74 kg/m².      Intake/Output Summary (Last 24 hours) at 2023 1021  Last data filed at 7/15/2023 1714  Gross per 24 hour   Intake 2810.63 ml   Output 250 ml   Net 2560.63 ml         Significant Labs:  Lab Results   Component Value Date    GROUPTRH A POS 2020    HEPBSAG Negative 2022    STREPBCULT No Group B Streptococcus isolated 2023     Recent Labs   Lab 23  0554   HGB 9.1*   HCT 26.9*       CBC:   Recent Labs   Lab 23  0554   WBC 14.11*   RBC 3.23*   HGB 9.1*   HCT 26.9*      MCV 83   MCH 28.2   MCHC 33.8     I have personallly reviewed all pertinent lab results from the last 24 hours.    Physical  Exam:   Constitutional: She appears well-developed and well-nourished. No distress.    HENT:   Head: Normocephalic and atraumatic.    Eyes: EOM are normal.     Cardiovascular:  Normal rate.             Pulmonary/Chest: Effort normal. No respiratory distress.        Abdominal: Soft. She exhibits no distension. There is no abdominal tenderness. There is no rebound and no guarding.             Musculoskeletal: Normal range of motion.       Neurological: She is alert.    Skin: Skin is warm and dry.    Psychiatric: She has a normal mood and affect.     Review of Systems    Assessment/Plan:     23 y.o. female  for:    *  (spontaneous vaginal delivery)  Routine postpartum care  Watch vaginal bleeding  Pain control  Lactation assistance  Discharge home tomorrow, 23              Disposition: As patient meets milestones, will plan to discharge home.    Michael Ag MD  Obstetrics  [facility]

## 2023-07-16 NOTE — ASSESSMENT & PLAN NOTE
Routine postpartum care  Watch vaginal bleeding  Pain control  Lactation assistance  Discharge home tomorrow, 7/17/23

## 2023-07-17 VITALS
DIASTOLIC BLOOD PRESSURE: 76 MMHG | RESPIRATION RATE: 18 BRPM | OXYGEN SATURATION: 100 % | TEMPERATURE: 98 F | SYSTOLIC BLOOD PRESSURE: 131 MMHG | HEART RATE: 61 BPM | HEIGHT: 62 IN | WEIGHT: 151.69 LBS | BODY MASS INDEX: 27.92 KG/M2

## 2023-07-17 LAB
RPR SER QL: NORMAL
RUBV IGG SER-ACNC: 20.5 IU/ML
RUBV IGG SER-IMP: REACTIVE

## 2023-07-17 PROCEDURE — 99238 HOSP IP/OBS DSCHRG MGMT 30/<: CPT | Mod: ,,, | Performed by: OBSTETRICS & GYNECOLOGY

## 2023-07-17 PROCEDURE — 25000003 PHARM REV CODE 250: Performed by: OBSTETRICS & GYNECOLOGY

## 2023-07-17 PROCEDURE — 99238 PR HOSPITAL DISCHARGE DAY,<30 MIN: ICD-10-PCS | Mod: ,,, | Performed by: OBSTETRICS & GYNECOLOGY

## 2023-07-17 RX ORDER — IBUPROFEN 800 MG/1
800 TABLET ORAL EVERY 8 HOURS PRN
Qty: 40 TABLET | Refills: 0 | Status: SHIPPED | OUTPATIENT
Start: 2023-07-17

## 2023-07-17 RX ORDER — HYDROCODONE BITARTRATE AND ACETAMINOPHEN 5; 325 MG/1; MG/1
1 TABLET ORAL EVERY 6 HOURS PRN
Qty: 10 TABLET | Refills: 0 | Status: SHIPPED | OUTPATIENT
Start: 2023-07-17

## 2023-07-17 RX ORDER — MEDROXYPROGESTERONE ACETATE 150 MG/ML
150 INJECTION, SUSPENSION INTRAMUSCULAR ONCE
Status: COMPLETED | OUTPATIENT
Start: 2023-07-17 | End: 2023-07-17

## 2023-07-17 RX ADMIN — IBUPROFEN 600 MG: 600 TABLET ORAL at 05:07

## 2023-07-17 RX ADMIN — HYDROCODONE BITARTRATE AND ACETAMINOPHEN 1 TABLET: 10; 325 TABLET ORAL at 03:07

## 2023-07-17 RX ADMIN — MEDROXYPROGESTERONE ACETATE 150 MG: 150 INJECTION, SUSPENSION, EXTENDED RELEASE INTRAMUSCULAR at 03:07

## 2023-07-17 RX ADMIN — IBUPROFEN 600 MG: 600 TABLET ORAL at 11:07

## 2023-07-17 RX ADMIN — HYDROCODONE BITARTRATE AND ACETAMINOPHEN 1 TABLET: 10; 325 TABLET ORAL at 08:07

## 2023-07-17 RX ADMIN — HYDROCORTISONE: 25 CREAM TOPICAL at 09:07

## 2023-07-17 NOTE — PROGRESS NOTES
[facility]  Obstetrics  Postpartum Progress Note    Patient Name: Earnest Islas  MRN: [id number]  Admission Date: 7/15/2023  Hospital Length of Stay: 2 days  Attending Physician: Aletha Pirece MD  Primary Care Provider: Dean Jensen MD    Subjective:     Principal Problem: (spontaneous vaginal delivery)    Hospital Course:   7/15 1618 M 6#10.9 3030 7/9    2023 Doing well.  Formula-feeding.  2023: PPD#2 s/p . Patient reports that she is ambulating, voiding, and tolerating PO. Lochia is mild and pain is controlled. She would like circumcision for her male infant.      Interval History:   She is doing well this morning. She is tolerating a regular diet without nausea or vomiting. She is voiding spontaneously. She is ambulating. She has passed flatus, and has not a BM. Vaginal bleeding is mild. She denies fever or chills. Abdominal pain is mild and controlled with oral medications. She Is not breastfeeding. She desires circumcision for her male baby: yes.    Objective:     Vital Signs (Most Recent):  Temp: 98.1 °F (36.7 °C) (23 1246)  Pulse: 61 (23 1246)  Resp: 18 (23 1246)  BP: 131/76 (23 1246)  SpO2: 100 % (23 1246) Vital Signs (24h Range):  Temp:  [97.8 °F (36.6 °C)-98.1 °F (36.7 °C)] 98.1 °F (36.7 °C)  Pulse:  [] 61  Resp:  [18-20] 18  SpO2:  [97 %-100 %] 100 %  BP: (120-146)/(68-86) 131/76     Weight: 68.8 kg (151 lb 10.8 oz)  Body mass index is 27.74 kg/m².    No intake or output data in the 24 hours ending 23 1306      Significant Labs:  Lab Results   Component Value Date    GROUPTRH A POS 2020    HEPBSAG Negative 2022    STREPBCULT No Group B Streptococcus isolated 2023     Recent Labs   Lab 23  0554   HGB 9.1*   HCT 26.9*       CBC:   Recent Labs   Lab 23  0554   WBC 14.11*   RBC 3.23*   HGB 9.1*   HCT 26.9*      MCV 83   MCH 28.2   MCHC 33.8     I have personallly reviewed all  pertinent lab results from the last 24 hours.    Physical Exam:   Constitutional: She is oriented to person, place, and time. She appears well-developed and well-nourished. No distress.       Cardiovascular:  Normal rate.             Pulmonary/Chest: Effort normal.        Abdominal: Soft. She exhibits no distension.                 Neurological: She is alert and oriented to person, place, and time.    Skin: Skin is warm and dry.    Psychiatric: She has a normal mood and affect.     Review of Systems   Constitutional:  Negative for chills and fever.   Eyes:  Negative for visual disturbance.   Respiratory:  Negative for cough and wheezing.    Cardiovascular:  Negative for chest pain and palpitations.   Gastrointestinal:  Negative for abdominal pain, nausea and vomiting.   Genitourinary:  Negative for dysuria, frequency, hematuria, pelvic pain, vaginal bleeding, vaginal discharge and vaginal pain.   Neurological:  Negative for headaches.   Psychiatric/Behavioral:  Negative for depression.      Assessment/Plan:     23 y.o. female  for:    *  (spontaneous vaginal delivery)  Routine postpartum care  Watch vaginal bleeding  Pain control  Lactation assistance  Discharge home today              Disposition: As patient meets milestones, will plan to discharge today.    Aletha Pierce MD  Obstetrics  [facility]

## 2023-07-17 NOTE — ASSESSMENT & PLAN NOTE
Routine postpartum care  Watch vaginal bleeding  Pain control  Lactation assistance  Discharge home today

## 2023-07-17 NOTE — DISCHARGE INSTRUCTIONS
After a Vaginal Birth    General Discharge Instructions    May follow a regular diet, unless otherwise discussed with physician.  Take showers, not baths unless otherwise discussed with physician.  Activity as tolerated.  No lifting or heavy exercise for 6 weeks, no driving for 2 weeks, no sexual intercourse, douching or tampons for 6 weeks  May return to work/school as discussed with physician  Take medications as directed  Discuss birth control with physician  Breast care support bra worn at all times  Lactation consultant referral number ( [phone] or [phone])    Call Your Healthcare Provider Right Away If You Have:  A temperature of 100.4°F or higher.  If your blood pressure is over 155/105.  You have difficulty catching your breath or trouble breathing.  Heavy vaginal bleeding, clots, or vaginal discharge with foul odor. (heavier than menses)  Persistent nausea or vomiting.  You gain more than 3 pounds in 3 days.  Severe headaches not relieved by Tylenol (acetaminophen) or Motrin (ibuprofen)  Blurry or double vision, see spots or flashing lights.  Dizziness or fainting.  New onset swelling or worsening of existing swelling.  Burning or pain when you urinate.  No bowel movement for 5 days.  Redness, warmth, swelling, or pain in the lower leg.  Redness, discharge, or pain worse than you had in the hospital.  Burning, pain, red streaks, or lumpy areas in your breasts.  Cracks, blisters, or blood on your nipples.  Feelings of extreme sadness or anxiety, or a feeling that you dont want to be with your baby.  If you have any new or unusual symptoms or have questions or concerns    Some of these symptoms can occur up to 4 to 6 weeks after delivery. This can be a sign of pre-eclampsia, which is a serious disease that can cause stroke, seizures, organ damage, or death. Do not wait to call your doctor or seek medical attention.    If You Had Stitches  You may have received stitches in the skin near your vagina.  The stitches might have closed an episiotomy (an incision that enlarges the opening of the vagina). Or you may have needed stitches to repair torn skin. Either way, your stitches should dissolve within weeks. Until then, you can help reduce discomfort, aid healing, and reduce your risk of infection by keeping the stitches clean. These tips can help:    Gently wipe from front to back after you urinate or have a bowel movement.  After wiping, spray warm water on the area. Or you can have a sitz bath. This means sitting in a tub with a few inches of water in it. Then pat the area dry or use a hairdryer on a cool setting.  You can take a shower instead of a bath.  Change sanitary pads at least every 2-4 hours.  Place cold or heat packs on the area as directed by your doctors or nurses. Keep a thin towel between the pack and your skin.  Sit on firm seats so the stitches pull less.     Follow-Up  Schedule a  follow-up exam with your healthcare provider for about 6 weeks after delivery. During this exam, your uterus and vaginal area will be checked. Contact your healthcare provider if you think you or your baby are having any problems.

## 2023-07-17 NOTE — PLAN OF CARE
Problem: Adult Inpatient Plan of Care  Goal: Plan of Care Review  Outcome: Met  Goal: Patient-Specific Goal (Individualized)  Outcome: Met  Goal: Absence of Hospital-Acquired Illness or Injury  Outcome: Met

## 2023-07-17 NOTE — SUBJECTIVE & OBJECTIVE
Interval History:   She is doing well this morning. She is tolerating a regular diet without nausea or vomiting. She is voiding spontaneously. She is ambulating. She has passed flatus, and has not a BM. Vaginal bleeding is mild. She denies fever or chills. Abdominal pain is mild and controlled with oral medications. She Is not breastfeeding. She desires circumcision for her male baby: yes.    Objective:     Vital Signs (Most Recent):  Temp: 98.1 °F (36.7 °C) (07/17/23 1246)  Pulse: 61 (07/17/23 1246)  Resp: 18 (07/17/23 1246)  BP: 131/76 (07/17/23 1246)  SpO2: 100 % (07/17/23 1246) Vital Signs (24h Range):  Temp:  [97.8 °F (36.6 °C)-98.1 °F (36.7 °C)] 98.1 °F (36.7 °C)  Pulse:  [] 61  Resp:  [18-20] 18  SpO2:  [97 %-100 %] 100 %  BP: (120-146)/(68-86) 131/76     Weight: 68.8 kg (151 lb 10.8 oz)  Body mass index is 27.74 kg/m².    No intake or output data in the 24 hours ending 07/17/23 1306      Significant Labs:  Lab Results   Component Value Date    GROUPTRH A POS 03/01/2020    HEPBSAG Negative 06/14/2022    STREPBCULT No Group B Streptococcus isolated 07/05/2023     Recent Labs   Lab 07/16/23  0554   HGB 9.1*   HCT 26.9*       CBC:   Recent Labs   Lab 07/16/23  0554   WBC 14.11*   RBC 3.23*   HGB 9.1*   HCT 26.9*      MCV 83   MCH 28.2   MCHC 33.8     I have personallly reviewed all pertinent lab results from the last 24 hours.    Physical Exam:   Constitutional: She is oriented to person, place, and time. She appears well-developed and well-nourished. No distress.       Cardiovascular:  Normal rate.             Pulmonary/Chest: Effort normal.        Abdominal: Soft. She exhibits no distension.                 Neurological: She is alert and oriented to person, place, and time.    Skin: Skin is warm and dry.    Psychiatric: She has a normal mood and affect.     Review of Systems   Constitutional:  Negative for chills and fever.   Eyes:  Negative for visual disturbance.   Respiratory:  Negative for  cough and wheezing.    Cardiovascular:  Negative for chest pain and palpitations.   Gastrointestinal:  Negative for abdominal pain, nausea and vomiting.   Genitourinary:  Negative for dysuria, frequency, hematuria, pelvic pain, vaginal bleeding, vaginal discharge and vaginal pain.   Neurological:  Negative for headaches.   Psychiatric/Behavioral:  Negative for depression.

## 2023-07-17 NOTE — PLAN OF CARE
VSS, Formula feeding per request. Supportive bra encouraged. Fundus and lochia WDL. Voiding, passing flatus, ambulating and tolerating regular diet. Bonding well with baby. Pain being managed with motrin and norco as needed. Stated an understanding to POC.

## 2023-07-19 NOTE — DISCHARGE SUMMARY
[facility]  Obstetrics  Discharge Summary      Patient Name: Earnest Islas  MRN: [id number]  Admission Date: 7/15/2023  Hospital Length of Stay: 2 days  Discharge Date and Time: 2023  4:06 PM  Attending Physician: No att. providers found   Discharging Provider: Aletha Pierce MD   Primary Care Provider: Dean Jensen MD    HPI:  Earnest Islas is a 23 y.o.  female with IUP at 38w2d weeks gestation who is admitted for an induction of labor secondary to rupture of membranes, labor.     This IUP is uncomplicated.  Patient reports contractions, denies vaginal bleeding, reports LOF.   Fetal Movement: normal.             * No surgery found *     Hospital Course:    7/15 1618 M 6#10.9 3030 2023 Doing well.  Formula-feeding.  2023: PPD#2 s/p . Patient reports that she is ambulating, voiding, and tolerating PO. Lochia is mild and pain is controlled. She would like circumcision for her male infant.           Final Active Diagnoses:    Diagnosis Date Noted POA    PRINCIPAL PROBLEM:   (spontaneous vaginal delivery) [O80] 07/15/2023 Not Applicable      Problems Resolved During this Admission:        Significant Diagnostic Studies: Labs: CBC No results for input(s): WBC, HGB, HCT, PLT in the last 48 hours.      Feeding Method: bottle    Immunizations     Date Immunization Status Dose Route/Site Given by    23 MMR Deleted 0.5 mL Subcutaneous/     23 Tdap Deleted 0.5 mL Intramuscular/           Delivery:    Episiotomy: None   Lacerations: None   Repair suture: None   Repair # of packets:     Blood loss (ml):       Birth information:  YOB: 2025   Time of birth: 4:18 PM   Sex: male   Delivery type: Vaginal, Spontaneous   Gestational Age: 38w2d    Delivery Clinician:      Other providers:       Additional  information:  Forceps:    Vacuum:    Breech:    Observed anomalies      Living?:           APGARS  One minute Five minutes  Ten minutes   Skin color:         Heart rate:         Grimace:         Muscle tone:         Breathing:         Totals: 7  9        Placenta: Delivered:       appearance    Pending Diagnostic Studies:     None          Discharged Condition: good    Disposition: Home or Self Care    Follow Up:   Follow-up Information     Aletha Pierce MD Follow up in 6 week(s).    Specialty: Obstetrics and Gynecology  Why: post partum  Contact information:  [address]  [address] [place] [address] [id number] [phone]                       Patient Instructions:      Diet Adult Regular     Pelvic Rest     Notify your health care provider if you experience any of the following:  temperature >100.4     Notify your health care provider if you experience any of the following:  persistent nausea and vomiting or diarrhea     Notify your health care provider if you experience any of the following:  severe uncontrolled pain     Notify your health care provider if you experience any of the following:  difficulty breathing or increased cough     Notify your health care provider if you experience any of the following:  severe persistent headache     Notify your health care provider if you experience any of the following:  worsening rash     Notify your health care provider if you experience any of the following:  increased confusion or weakness     Notify your health care provider if you experience any of the following:  persistent dizziness, light-headedness, or visual disturbances     Activity as tolerated     Medications:  Discharge Medication List as of 7/17/2023  5:36 PM      START taking these medications    Details   HYDROcodone-acetaminophen (NORCO) 5-325 mg per tablet Take 1 tablet by mouth every 6 (six) hours as needed for Pain., Starting Mon 7/17/2023, Normal      ibuprofen (ADVIL,MOTRIN) 800 MG tablet Take 1 tablet (800 mg total) by mouth every 8 (eight) hours as needed for Pain., Starting Mon 7/17/2023, Normal         CONTINUE  these medications which have NOT CHANGED    Details   prenatal vit 87-iron-folic-dha (PRENATE MINI, FERR ASP GLYCIN,) 18-1-350 mg Cap Take 1 tablet by mouth once daily., Starting Thu 5/18/2023, OTC         STOP taking these medications       acyclovir 5% (ZOVIRAX) 5 % Crea Comments:   Reason for Stopping:         oxycodone-acetaminophen (PERCOCET) 5-325 mg per tablet Comments:   Reason for Stopping:               Aletha Pierce MD  Obstetrics  [facility]

## 2023-08-22 ENCOUNTER — POSTPARTUM VISIT (OUTPATIENT)
Dept: OBSTETRICS AND GYNECOLOGY | Facility: CLINIC | Age: 23
End: 2023-08-22
Payer: MEDICAID

## 2023-08-22 VITALS
SYSTOLIC BLOOD PRESSURE: 114 MMHG | WEIGHT: 134.13 LBS | HEIGHT: 62 IN | DIASTOLIC BLOOD PRESSURE: 62 MMHG | BODY MASS INDEX: 24.68 KG/M2

## 2023-08-22 DIAGNOSIS — R82.90 BAD ODOR OF URINE: ICD-10-CM

## 2023-08-22 DIAGNOSIS — N89.8 VAGINAL ITCHING: ICD-10-CM

## 2023-08-22 LAB
BILIRUB UR QL STRIP: NEGATIVE
CLARITY UR: CLEAR
COLOR UR: YELLOW
GLUCOSE UR QL STRIP: NEGATIVE
HGB UR QL STRIP: ABNORMAL
KETONES UR QL STRIP: NEGATIVE
LEUKOCYTE ESTERASE UR QL STRIP: NEGATIVE
MICROSCOPIC COMMENT: NORMAL
NITRITE UR QL STRIP: NEGATIVE
PH UR STRIP: 6 [PH] (ref 5–8)
PROT UR QL STRIP: NEGATIVE
RBC #/AREA URNS HPF: 1 /HPF (ref 0–4)
SP GR UR STRIP: 1.02 (ref 1–1.03)
SQUAMOUS #/AREA URNS HPF: 1 /HPF
URN SPEC COLLECT METH UR: ABNORMAL
UROBILINOGEN UR STRIP-ACNC: NEGATIVE EU/DL

## 2023-08-22 PROCEDURE — 81000 URINALYSIS NONAUTO W/SCOPE: CPT

## 2023-08-22 PROCEDURE — 87491 CHLMYD TRACH DNA AMP PROBE: CPT

## 2023-08-22 PROCEDURE — 59430 PR CARE AFTER DELIVERY ONLY: ICD-10-PCS | Mod: ,,,

## 2023-08-22 PROCEDURE — 81514 NFCT DS BV&VAGINITIS DNA ALG: CPT

## 2023-08-22 PROCEDURE — 87086 URINE CULTURE/COLONY COUNT: CPT

## 2023-08-22 PROCEDURE — 99999 PR PBB SHADOW E&M-EST. PATIENT-LVL III: ICD-10-PCS | Mod: PBBFAC,,,

## 2023-08-22 PROCEDURE — 99999 PR PBB SHADOW E&M-EST. PATIENT-LVL III: CPT | Mod: PBBFAC,,,

## 2023-08-22 PROCEDURE — 99213 OFFICE O/P EST LOW 20 MIN: CPT | Mod: PBBFAC

## 2023-08-22 NOTE — PROGRESS NOTES
Earnest Islas is a 23 y.o. female  presents for a postpartum visit.  She is status post  6 weeks ago.  Her hospitalization was not complicated.  She is not breastfeeding.  She desires Depo-Provera for contraception. She received her first shot after delivery 2023.  She denies postpartum depression.    Delivery Date:  7/15/2023   Delivery MD:  Dr. Elvis Foley  Gender:  Male  Baby Name:  Antonieta Escoto  Birth Weight:  6 lb 10 oz  Breast Feeding: no (formula feeding)  Vaginal Bleeding: spotting  Incontinence: No  Depression: No  [place] yet: No  Contraception discussed yes- on depo  Support system: Good.  Her last pap was 2022 ASCUS/ HPV neg    Past Medical History:   Diagnosis Date    Anemia     Gestational hypertension 3/3/2020     Past Surgical History:   Procedure Laterality Date    WISDOM TOOTH EXTRACTION       Review of patient's allergies indicates:  No Known Allergies    Current Outpatient Medications:     HYDROcodone-acetaminophen (NORCO) 5-325 mg per tablet, Take 1 tablet by mouth every 6 (six) hours as needed for Pain., Disp: 10 tablet, Rfl: 0    ibuprofen (ADVIL,MOTRIN) 800 MG tablet, Take 1 tablet (800 mg total) by mouth every 8 (eight) hours as needed for Pain., Disp: 40 tablet, Rfl: 0    prenatal vit 87-iron-folic-dha (PRENATE MINI, FERR ASP GLYCIN,) 18-1-350 mg Cap, Take 1 tablet by mouth once daily., Disp: 90 capsule, Rfl: 12      Vitals:    23 1502   BP: 114/62       ABDOMEN: Soft. No tenderness or masses. No hepatosplenomegaly. No hernias.  BREASTS: exam deferred  PELVIC: External female genitalia without lesions, well-healed.  Female hair distribution. Adequate perineal body without evident defect, Normal urethral meatus. Vagina moist and well rugated without lesions or discharge. Cervix pink without lesions, discharge or tenderness. Uterus is 4-6 week size, regular, mobile and nontender. Adnexa without masses or tenderness.    Assessment:  Normal postpartum  exam    Plan:  Counseling regarding resuming normal activities of exercise and work.  Postpartum precautions reviewed  May return back to work if applicable  Routine follow up in 1 yr    SLADE Villeda     I spent a total of 20 minutes on the day of the visit. This includes face to face time and non-face to face time preparing to see the patient (eg, review of tests and charts), Obtaining and/or reviewing separately obtained history, Documenting clinical information in the electronic or other health record, Independently interpreting results and communicating results to the patient/family/caregiver, or Care coordination.

## 2023-08-23 LAB
C TRACH DNA SPEC QL NAA+PROBE: NOT DETECTED
N GONORRHOEA DNA SPEC QL NAA+PROBE: NOT DETECTED

## 2023-08-24 DIAGNOSIS — B96.89 BV (BACTERIAL VAGINOSIS): Primary | ICD-10-CM

## 2023-08-24 DIAGNOSIS — N76.0 BV (BACTERIAL VAGINOSIS): Primary | ICD-10-CM

## 2023-08-24 LAB
BACTERIA UR CULT: NORMAL
BACTERIAL VAGINOSIS DNA: POSITIVE
CANDIDA GLABRATA DNA: NEGATIVE
CANDIDA KRUSEI DNA: NEGATIVE
CANDIDA RRNA VAG QL PROBE: NEGATIVE
T VAGINALIS RRNA GENITAL QL PROBE: NEGATIVE

## 2023-08-24 RX ORDER — METRONIDAZOLE 500 MG/1
500 TABLET ORAL 2 TIMES DAILY
Qty: 14 TABLET | Refills: 0 | Status: SHIPPED | OUTPATIENT
Start: 2023-08-24 | End: 2023-08-31

## 2023-08-29 ENCOUNTER — TELEPHONE (OUTPATIENT)
Dept: OBSTETRICS AND GYNECOLOGY | Facility: CLINIC | Age: 23
End: 2023-08-29
Payer: MEDICAID

## 2023-08-29 NOTE — TELEPHONE ENCOUNTER
----- Message from Basia Horvath sent at 8/29/2023 12:36 PM CDT -----  Regarding: patient call back  Type: Patient Call Back    Who called: Self     What is the request in detail: Asked for a call back about a Rx that she needs sent to pharmacy. She doesn't remember the name of it.     Can the clinic reply by [facility]? No     Would the patient rather a call back or a response via [facility]? Call     Best call back number: .[phone]